# Patient Record
Sex: FEMALE | Race: WHITE | NOT HISPANIC OR LATINO | Employment: UNEMPLOYED | URBAN - METROPOLITAN AREA
[De-identification: names, ages, dates, MRNs, and addresses within clinical notes are randomized per-mention and may not be internally consistent; named-entity substitution may affect disease eponyms.]

---
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
## 2024-01-03 ENCOUNTER — OFFICE VISIT (OUTPATIENT)
Dept: OBGYN CLINIC | Facility: OTHER | Age: 19
End: 2024-01-03
Payer: COMMERCIAL

## 2024-01-03 VITALS — WEIGHT: 140 LBS | BODY MASS INDEX: 21.97 KG/M2 | HEIGHT: 67 IN

## 2024-01-03 DIAGNOSIS — S83.242A OTHER TEAR OF MEDIAL MENISCUS, CURRENT INJURY, LEFT KNEE, INITIAL ENCOUNTER: Primary | ICD-10-CM

## 2024-01-03 PROCEDURE — 99214 OFFICE O/P EST MOD 30 MIN: CPT | Performed by: ORTHOPAEDIC SURGERY

## 2024-01-03 RX ORDER — CEFAZOLIN SODIUM 1 G/50ML
1000 SOLUTION INTRAVENOUS ONCE
Status: CANCELLED | OUTPATIENT
Start: 2024-01-04 | End: 2024-01-03

## 2024-01-03 RX ORDER — METHOCARBAMOL 500 MG/1
TABLET, FILM COATED ORAL
COMMUNITY

## 2024-01-03 RX ORDER — CHLORHEXIDINE GLUCONATE ORAL RINSE 1.2 MG/ML
15 SOLUTION DENTAL ONCE
Status: CANCELLED | OUTPATIENT
Start: 2024-01-04 | End: 2024-01-03

## 2024-01-03 RX ORDER — TRANEXAMIC ACID 10 MG/ML
1000 INJECTION, SOLUTION INTRAVENOUS ONCE
Status: CANCELLED | OUTPATIENT
Start: 2024-01-04 | End: 2024-01-03

## 2024-01-03 NOTE — H&P (VIEW-ONLY)
"Orthopaedic Surgery - Office Note  Nupur Saleem (18 y.o. female)   : 2005   MRN: [id number]  Encounter Date: 1/3/2024    Chief Complaint   Patient presents with    Left Knee - Pain       Assessment / Plan  Left Knee - Bucket handle tear of lateral meniscus    The diagnosis and treatment options were reviewed.  The patient wishes to proceed with lateral meniscus repair.  The risks, benefits, and alternatives were discussed.  Written consent was obtained.  No follow-ups on file.    History of Present Illness  Nupur Saleem is a 18 y.o. female who presents for left knee pain. She reports on 2023, she went to sit down on the couch with her knee in flexion and underneath her. When she put pressure on the knee she felt a pop and immediate pain. She went to the ED at [facility]. She reports she is unable to fully extend her knee. She has been using crutches to help her walk. She got an MRI, which shows a bucket handle tear of the lateral mensicus. She denies any numbness and tingling.    Review of Systems  Pertinent items are noted in HPI.  All other systems were reviewed and are negative.    Physical Exam  Ht 5' 7\" (1.702 m)   Wt 63.5 kg (140 lb)   BMI 21.93 kg/m²   Cons: Appears well.  No apparent distress.  Psych: Alert. Oriented x3.  Mood and affect normal.  Eyes: PERRLA, EOMI  Resp: Normal effort.  No audible wheezing or stridor.  CV: Palpable pulse.  No discernable arrhythmia.  No LE edema.  Lymph:  No palpable cervical, axillary, or inguinal lymphadenopathy.  Skin: Warm.  No palpable masses.  No visible lesions.  Neuro: Normal muscle tone.  Normal and symmetric DTR's.     Left Knee Exam  Alignment:  Normal knee alignment.  Inspection:  No swelling. No ecchymosis.  Palpation:   moderate tenderness at lateral joint line.  ROM:  Knee Extension 45. Knee Flexion 120.  Strength:  5/5 quadriceps and hamstrings.  Stability:  No objective knee instability. Stable Varus / Valgus stress, Lachman, and Posterior " drawer.  Tests:  (+) Kang.  Patella:  Patella tracks centrally without crepitus.  Neurovascular:  Sensation intact in DP/SP/Sarah/Sa/T nerve distributions.  2+ DP & PT pulses.  Gait:  Not tested.     Studies Reviewed  I have personally reviewed pertinent films in PACS.  MRI of left knee - bucket handle tear of lateral meniscus    Procedures  No procedures today.    Medical, Surgical, Family, and Social History  The patient's medical history, family history, and social history, were reviewed and updated as appropriate.    History reviewed. No pertinent past medical history.    Past Surgical History:   Procedure Laterality Date    BUNIONECTOMY Left 07/11/2023       Family History   Problem Relation Age of Onset    Thyroid disease Mother     Hypertension Father     Asthma Sister        Social History     Occupational History    Not on file   Tobacco Use    Smoking status: Never    Smokeless tobacco: Never   Vaping Use    Vaping status: Never Used   Substance and Sexual Activity    Alcohol use: Never    Drug use: Never    Sexual activity: Yes     Birth control/protection: Condom Male, OCP       No Known Allergies      Current Outpatient Medications:     clotrimazole-betamethasone (LOTRISONE) 1-0.05 % cream, Apply topically 2 (two) times a day, Disp: 45 g, Rfl: 1    drospirenone-ethinyl estradiol (HAILEY) 3-0.02 MG per tablet, Take 1 tablet by mouth daily, Disp: 84 tablet, Rfl: 3    methocarbamol (ROBAXIN) 500 mg tablet, TAKE 1 TABLET BY MOUTH 4 TIMES DAILY AS NEEDED FOR MUSCLE SPASMS, Disp: , Rfl:     HYDROcodone-acetaminophen (NORCO) 5-325 mg per tablet, TAKE 1 TABLET BY MOUTH EVERY 6 HOURS AS NEEDED FOR SEVERE PAIN (PAIN SCORE 7-10). MAX DAILY AMOUNT: 4 TABLETS (Patient not taking: Reported on 7/21/2023), Disp: , Rfl:     ibuprofen (MOTRIN) 600 mg tablet, Take 600 mg by mouth 3 (three) times daily after meals (Patient not taking: Reported on 10/13/2023), Disp: , Rfl:       Keaton Lomas Attestation       I,:  Keaton Scales am acting as a scribe while in the presence of the attending physician.:       I,:  Akil Fuentes MD personally performed the services described in this documentation    as scribed in my presence.:

## 2024-01-03 NOTE — PROGRESS NOTES
"Orthopaedic Surgery - Office Note  Nupur Saleem (18 y.o. female)   : 2005   MRN: [id number]  Encounter Date: 1/3/2024    Chief Complaint   Patient presents with    Left Knee - Pain       Assessment / Plan  Left Knee - Bucket handle tear of lateral meniscus    The diagnosis and treatment options were reviewed.  The patient wishes to proceed with lateral meniscus repair.  The risks, benefits, and alternatives were discussed.  Written consent was obtained.  No follow-ups on file.    History of Present Illness  Nupur Saleem is a 18 y.o. female who presents for left knee pain. She reports on 2023, she went to sit down on the couch with her knee in flexion and underneath her. When she put pressure on the knee she felt a pop and immediate pain. She went to the ED at [facility]. She reports she is unable to fully extend her knee. She has been using crutches to help her walk. She got an MRI, which shows a bucket handle tear of the lateral mensicus. She denies any numbness and tingling.    Review of Systems  Pertinent items are noted in HPI.  All other systems were reviewed and are negative.    Physical Exam  Ht 5' 7\" (1.702 m)   Wt 63.5 kg (140 lb)   BMI 21.93 kg/m²   Cons: Appears well.  No apparent distress.  Psych: Alert. Oriented x3.  Mood and affect normal.  Eyes: PERRLA, EOMI  Resp: Normal effort.  No audible wheezing or stridor.  CV: Palpable pulse.  No discernable arrhythmia.  No LE edema.  Lymph:  No palpable cervical, axillary, or inguinal lymphadenopathy.  Skin: Warm.  No palpable masses.  No visible lesions.  Neuro: Normal muscle tone.  Normal and symmetric DTR's.     Left Knee Exam  Alignment:  Normal knee alignment.  Inspection:  No swelling. No ecchymosis.  Palpation:   moderate tenderness at lateral joint line.  ROM:  Knee Extension 45. Knee Flexion 120.  Strength:  5/5 quadriceps and hamstrings.  Stability:  No objective knee instability. Stable Varus / Valgus stress, Lachman, and Posterior " drawer.  Tests:  (+) Kang.  Patella:  Patella tracks centrally without crepitus.  Neurovascular:  Sensation intact in DP/SP/Sarah/Sa/T nerve distributions.  2+ DP & PT pulses.  Gait:  Not tested.     Studies Reviewed  I have personally reviewed pertinent films in PACS.  MRI of left knee - bucket handle tear of lateral meniscus    Procedures  No procedures today.    Medical, Surgical, Family, and Social History  The patient's medical history, family history, and social history, were reviewed and updated as appropriate.    History reviewed. No pertinent past medical history.    Past Surgical History:   Procedure Laterality Date    BUNIONECTOMY Left 07/11/2023       Family History   Problem Relation Age of Onset    Thyroid disease Mother     Hypertension Father     Asthma Sister        Social History     Occupational History    Not on file   Tobacco Use    Smoking status: Never    Smokeless tobacco: Never   Vaping Use    Vaping status: Never Used   Substance and Sexual Activity    Alcohol use: Never    Drug use: Never    Sexual activity: Yes     Birth control/protection: Condom Male, OCP       No Known Allergies      Current Outpatient Medications:     clotrimazole-betamethasone (LOTRISONE) 1-0.05 % cream, Apply topically 2 (two) times a day, Disp: 45 g, Rfl: 1    drospirenone-ethinyl estradiol (HAILEY) 3-0.02 MG per tablet, Take 1 tablet by mouth daily, Disp: 84 tablet, Rfl: 3    methocarbamol (ROBAXIN) 500 mg tablet, TAKE 1 TABLET BY MOUTH 4 TIMES DAILY AS NEEDED FOR MUSCLE SPASMS, Disp: , Rfl:     HYDROcodone-acetaminophen (NORCO) 5-325 mg per tablet, TAKE 1 TABLET BY MOUTH EVERY 6 HOURS AS NEEDED FOR SEVERE PAIN (PAIN SCORE 7-10). MAX DAILY AMOUNT: 4 TABLETS (Patient not taking: Reported on 7/21/2023), Disp: , Rfl:     ibuprofen (MOTRIN) 600 mg tablet, Take 600 mg by mouth 3 (three) times daily after meals (Patient not taking: Reported on 10/13/2023), Disp: , Rfl:       Keaton Lomas Attestation       I,:  Keaton Scales am acting as a scribe while in the presence of the attending physician.:       I,:  Akil Fuentes MD personally performed the services described in this documentation    as scribed in my presence.:

## 2024-01-04 ENCOUNTER — ANESTHESIA (OUTPATIENT)
Dept: PERIOP | Facility: HOSPITAL | Age: 19
End: 2024-01-04
Payer: COMMERCIAL

## 2024-01-04 ENCOUNTER — ANESTHESIA EVENT (OUTPATIENT)
Dept: PERIOP | Facility: HOSPITAL | Age: 19
End: 2024-01-04
Payer: COMMERCIAL

## 2024-01-04 ENCOUNTER — HOSPITAL ENCOUNTER (OUTPATIENT)
Facility: HOSPITAL | Age: 19
Setting detail: OUTPATIENT SURGERY
Discharge: HOME | End: 2024-01-04
Attending: ORTHOPAEDIC SURGERY | Admitting: ORTHOPAEDIC SURGERY
Payer: COMMERCIAL

## 2024-01-04 VITALS
TEMPERATURE: 98.9 F | HEART RATE: 82 BPM | WEIGHT: 146.61 LBS | OXYGEN SATURATION: 97 % | SYSTOLIC BLOOD PRESSURE: 113 MMHG | BODY MASS INDEX: 23.01 KG/M2 | RESPIRATION RATE: 17 BRPM | DIASTOLIC BLOOD PRESSURE: 57 MMHG | HEIGHT: 67 IN

## 2024-01-04 DIAGNOSIS — S83.282A ACUTE LATERAL MENISCUS TEAR OF LEFT KNEE, INITIAL ENCOUNTER: Primary | ICD-10-CM

## 2024-01-04 LAB
EXT PREGNANCY TEST URINE: NEGATIVE
EXT. CONTROL: NORMAL

## 2024-01-04 PROCEDURE — 81025 URINE PREGNANCY TEST: CPT | Performed by: ORTHOPAEDIC SURGERY

## 2024-01-04 PROCEDURE — C1713 ANCHOR/SCREW BN/BN,TIS/BN: HCPCS | Performed by: ORTHOPAEDIC SURGERY

## 2024-01-04 PROCEDURE — 29882 ARTHRS KNE SRG MNISC RPR M/L: CPT | Performed by: ORTHOPAEDIC SURGERY

## 2024-01-04 DEVICE — FAST-FIX 360 CURVED MENISCAL                                    REPAIR SYSTEM
Type: IMPLANTABLE DEVICE | Site: KNEE | Status: FUNCTIONAL
Brand: FAST-FIX

## 2024-01-04 DEVICE — FAST-FIX 360 REVERSED CURVE                                    MENISCAL REPAIR SYSTEM
Type: IMPLANTABLE DEVICE | Site: KNEE | Status: FUNCTIONAL
Brand: FAST-FIX

## 2024-01-04 RX ORDER — SODIUM CHLORIDE 9 MG/ML
125 INJECTION, SOLUTION INTRAVENOUS CONTINUOUS
Status: DISCONTINUED | OUTPATIENT
Start: 2024-01-04 | End: 2024-01-04 | Stop reason: HOSPADM

## 2024-01-04 RX ORDER — PROPOFOL 10 MG/ML
INJECTION, EMULSION INTRAVENOUS AS NEEDED
Status: DISCONTINUED | OUTPATIENT
Start: 2024-01-04 | End: 2024-01-04

## 2024-01-04 RX ORDER — ONDANSETRON 2 MG/ML
4 INJECTION INTRAMUSCULAR; INTRAVENOUS ONCE AS NEEDED
Status: DISCONTINUED | OUTPATIENT
Start: 2024-01-04 | End: 2024-01-04 | Stop reason: HOSPADM

## 2024-01-04 RX ORDER — OXYCODONE HYDROCHLORIDE 5 MG/1
5 TABLET ORAL EVERY 4 HOURS PRN
Status: DISCONTINUED | OUTPATIENT
Start: 2024-01-04 | End: 2024-01-04 | Stop reason: HOSPADM

## 2024-01-04 RX ORDER — LIDOCAINE HCL/EPINEPHRINE/PF 2%-1:200K
VIAL (ML) INJECTION AS NEEDED
Status: DISCONTINUED | OUTPATIENT
Start: 2024-01-04 | End: 2024-01-04

## 2024-01-04 RX ORDER — CHLORHEXIDINE GLUCONATE ORAL RINSE 1.2 MG/ML
15 SOLUTION DENTAL ONCE
Status: COMPLETED | OUTPATIENT
Start: 2024-01-04 | End: 2024-01-04

## 2024-01-04 RX ORDER — ONDANSETRON 4 MG/1
4 TABLET, ORALLY DISINTEGRATING ORAL EVERY 8 HOURS PRN
Qty: 15 TABLET | Refills: 0 | Status: SHIPPED | OUTPATIENT
Start: 2024-01-04

## 2024-01-04 RX ORDER — MIDAZOLAM HYDROCHLORIDE 2 MG/2ML
INJECTION, SOLUTION INTRAMUSCULAR; INTRAVENOUS AS NEEDED
Status: DISCONTINUED | OUTPATIENT
Start: 2024-01-04 | End: 2024-01-04

## 2024-01-04 RX ORDER — OXYCODONE HYDROCHLORIDE 10 MG/1
10 TABLET ORAL EVERY 4 HOURS PRN
Status: DISCONTINUED | OUTPATIENT
Start: 2024-01-04 | End: 2024-01-04 | Stop reason: HOSPADM

## 2024-01-04 RX ORDER — OXYCODONE HYDROCHLORIDE 5 MG/1
5 TABLET ORAL EVERY 4 HOURS PRN
Qty: 30 TABLET | Refills: 0 | Status: SHIPPED | OUTPATIENT
Start: 2024-01-04 | End: 2024-01-14

## 2024-01-04 RX ORDER — DEXAMETHASONE SODIUM PHOSPHATE 10 MG/ML
INJECTION, SOLUTION INTRAMUSCULAR; INTRAVENOUS AS NEEDED
Status: DISCONTINUED | OUTPATIENT
Start: 2024-01-04 | End: 2024-01-04

## 2024-01-04 RX ORDER — ONDANSETRON 2 MG/ML
INJECTION INTRAMUSCULAR; INTRAVENOUS AS NEEDED
Status: DISCONTINUED | OUTPATIENT
Start: 2024-01-04 | End: 2024-01-04

## 2024-01-04 RX ORDER — CEFAZOLIN SODIUM 1 G/50ML
1000 SOLUTION INTRAVENOUS ONCE
Status: COMPLETED | OUTPATIENT
Start: 2024-01-04 | End: 2024-01-04

## 2024-01-04 RX ORDER — FENTANYL CITRATE/PF 50 MCG/ML
50 SYRINGE (ML) INJECTION
Status: DISCONTINUED | OUTPATIENT
Start: 2024-01-04 | End: 2024-01-04 | Stop reason: HOSPADM

## 2024-01-04 RX ORDER — NAPROXEN 500 MG/1
500 TABLET ORAL 2 TIMES DAILY WITH MEALS
Qty: 60 TABLET | Refills: 0 | Status: SHIPPED | OUTPATIENT
Start: 2024-01-04

## 2024-01-04 RX ORDER — ROPIVACAINE HYDROCHLORIDE 5 MG/ML
INJECTION, SOLUTION EPIDURAL; INFILTRATION; PERINEURAL AS NEEDED
Status: DISCONTINUED | OUTPATIENT
Start: 2024-01-04 | End: 2024-01-04

## 2024-01-04 RX ORDER — TRANEXAMIC ACID 10 MG/ML
1000 INJECTION, SOLUTION INTRAVENOUS ONCE
Status: DISCONTINUED | OUTPATIENT
Start: 2024-01-04 | End: 2024-01-04 | Stop reason: HOSPADM

## 2024-01-04 RX ORDER — LIDOCAINE HYDROCHLORIDE 20 MG/ML
INJECTION, SOLUTION EPIDURAL; INFILTRATION; INTRACAUDAL; PERINEURAL AS NEEDED
Status: DISCONTINUED | OUTPATIENT
Start: 2024-01-04 | End: 2024-01-04

## 2024-01-04 RX ORDER — FENTANYL CITRATE 50 UG/ML
INJECTION, SOLUTION INTRAMUSCULAR; INTRAVENOUS AS NEEDED
Status: DISCONTINUED | OUTPATIENT
Start: 2024-01-04 | End: 2024-01-04

## 2024-01-04 RX ADMIN — MIDAZOLAM 4 MG: 1 INJECTION INTRAMUSCULAR; INTRAVENOUS at 10:25

## 2024-01-04 RX ADMIN — CEFAZOLIN SODIUM 1000 MG: 1 SOLUTION INTRAVENOUS at 10:48

## 2024-01-04 RX ADMIN — FENTANYL CITRATE 100 MCG: 0.05 INJECTION, SOLUTION INTRAMUSCULAR; INTRAVENOUS at 10:25

## 2024-01-04 RX ADMIN — ROPIVACAINE HYDROCHLORIDE 30 ML: 5 INJECTION, SOLUTION EPIDURAL; INFILTRATION; PERINEURAL at 10:28

## 2024-01-04 RX ADMIN — LIDOCAINE HYDROCHLORIDE AND EPINEPHRINE 20 ML: 20; 5 INJECTION, SOLUTION EPIDURAL; INFILTRATION; INTRACAUDAL; PERINEURAL at 10:28

## 2024-01-04 RX ADMIN — CHLORHEXIDINE GLUCONATE 0.12% ORAL RINSE 15 ML: 1.2 LIQUID ORAL at 09:15

## 2024-01-04 RX ADMIN — OXYCODONE HYDROCHLORIDE 5 MG: 5 TABLET ORAL at 13:12

## 2024-01-04 RX ADMIN — ONDANSETRON 4 MG: 2 INJECTION INTRAMUSCULAR; INTRAVENOUS at 11:18

## 2024-01-04 RX ADMIN — LIDOCAINE HYDROCHLORIDE 60 MG: 20 INJECTION, SOLUTION EPIDURAL; INFILTRATION; INTRACAUDAL at 10:44

## 2024-01-04 RX ADMIN — PROPOFOL 200 MG: 10 INJECTION, EMULSION INTRAVENOUS at 10:44

## 2024-01-04 RX ADMIN — SODIUM CHLORIDE 125 ML/HR: 0.9 INJECTION, SOLUTION INTRAVENOUS at 10:29

## 2024-01-04 RX ADMIN — SODIUM CHLORIDE 125 ML/HR: 0.9 INJECTION, SOLUTION INTRAVENOUS at 09:15

## 2024-01-04 RX ADMIN — DEXAMETHASONE SODIUM PHOSPHATE 6 MG: 10 INJECTION INTRAMUSCULAR; INTRAVENOUS at 10:53

## 2024-01-04 NOTE — DISCHARGE INSTR - AVS FIRST PAGE
POSTOPERATIVE INSTRUCTIONS following KNEE SURGERY    MEDICATIONS:  Resume all home medications unless otherwise instructed by your surgeon.  Pain Medication:  Oxycodone 5 mg, 1-3 tablets every 3 hours as needed  If you were given a regional anesthetic (nerve block), please begin taking the pain medication as soon as you get home, even if you have minimal or no pain.  DO NOT WAIT FOR THE NERVE BLOCK TO WEAR OFF.  Possible side effects include nausea, constipation, and urinary retention.  If you experience these side effects, please call our office for assistance.  Pain med refills are authorized only during office hours (8am-4pm, Mon-Fri).  Anti-Inflammatory:  Naproxen 500 mg, 1 tablet every 12 hours for 4 weeks  Take with food.  Stop if you experience nausea, reflux, or stomach pain.  Nausea Medication:  Zofran ODT 4 mg, 1 tablet every 6 hours as needed  Fill prescription ONLY if you expericnce severe nausea.  Blood Clot Prevention:  Not Necessary  Pump your foot up and down 20 times per hour while you are less mobile.    WOUND CARE:  Keep the dressing clean and dry.  Light drainage may occur the first 2 days postop.  You may remove the dressings and get the incision wet in the shower 72 hours after surgery.  DO NOT remove steri-strips or sutures.  DO NOT immerse the incision under water.  Carefully pat the incision dry.  If there is wound drainage, re-apply a fresh dry gauze dressing.  Please call our office ([phone]) if you experience either of the following:  Sudden increase in swelling, redness, or warmth at the surgical site  Excessive incisional drainage that persists beyond the 3rd day after surgery  Oral temperature greater than 101 degrees, not relieved with Tylenol  Shortness of breath, chest pain, nausea, or any other concerning symptoms    SWELLING CONTROL:  Cold Therapy:  The cold therapy device may be used either continuously or only as needed, according to your preference.  Do not let the pad  "directly touch your skin.  Alternatively, apply ice (20 min on, 20 min off) as often as you feel is necessary.  Elevation:  Elevate the entire leg above heart level.  Place pillows under your ankle to keep your knee straight.  Compression:  Apply ACE wraps or a thigh-length compression stocking as needed.    RANGE OF MOTION:  You are allowed LIMITED RANGE OF MOTION from 0 to 90 degrees of flexion.    IMMOBILIZATION:  Your knee brace should be WORN AT ALL TIMES, including sleep.  Keep the brace LOCKED FOR WALKING.  You may unlock the brace while sitting or sleeping.  You may remove the brace only for showering.    ACTIVITY:   BEAR 50% PARTIAL WEIGHT on your operative leg.  Always use crutches to assist.  Using Crutches on Stairs:  Going up, lead with your \"good\" (nonoperative) leg.  Going down, lead with your \"bad\" (operative) leg.  Use a hand rail when available.  Knee Extension:  Place a rolled towel or pillow under your ankle for 20-30 minutes 3-5 times per day.  This will help to maintain full knee extension.  Quad Sets:  Sit or lie with your knee straight.  Tighten your quadriceps (front thigh) muscle.  Hold for 3 seconds, then relax.  Repeat 20 times per hour while awake.    PHYSICAL THERAPY:  Begin therapy 5 TO 7 DAYS AFTER SURGERY.  You were given a prescription for therapy at your preoperative office visit.  If you do not have physical therapy scheduled yet, please call our office for assistance.    FOLLOW-UP APPOINTMENT:  4-5 days after surgery with:    Dr. Akil Fuentes MD  [facility]  [address] [place] [address], [address], [place] [address]  [phone] ([place] Office)  [phone] (After-Hours)    " Unknown

## 2024-01-04 NOTE — ANESTHESIA POSTPROCEDURE EVALUATION
"Post-Op Assessment Note    CV Status:  Stable    Pain management: adequate       Mental Status:  Alert and awake   Hydration Status:  Euvolemic   PONV Controlled:  Controlled   Airway Patency:  Patent     Post Op Vitals Reviewed: Yes      Staff: Anesthesiologist               /68 (01/04/24 1243)    Temp 98.7 °F (37.1 °C) (01/04/24 1243)    Pulse 82 (01/04/24 1243)   Resp 16 (01/04/24 1243)    SpO2 100 % (01/04/24 1243)    /68   Pulse 82   Temp 98.7 °F (37.1 °C) (Temporal)   Resp 16   Ht 5' 7\" (1.702 m)   Wt 66.5 kg (146 lb 9.7 oz)   LMP 12/04/2023 (Approximate)   SpO2 100%   BMI 22.96 kg/m²     "

## 2024-01-04 NOTE — OP NOTE
OPERATIVE REPORT    PATIENT NAME: Nupur Saleem   :  2005  MRN: [id number]  Pt Location: [facility] OR ROOM 02    SURGERY DATE: 2024    SURGEON(S) and ROLE:  Primary: Akil Fuentes MD    NOTE:  No qualified resident or physician assistant was available for the case.      PREOPERATIVE DIAGNOSES:  Left Knee  Lateral Meniscus Tear    POSTOPERATIVE DIAGNOSES:  Left Knee  Same as Preoperative Diagnosis    PROCEDURES:  Left Knee Arthroscopy with:  Lateral Meniscus Repair      ANESTHESIA TYPE:  General LMA and Intra-articular block    ANESTHESIA STAFF:   Anesthesiologist: Pepper Gutierrez MD  CRNA: Kenia Serna CRNA    ESTIMATED BLOOD LOSS:  5 mL    TOURNIQUET TIME:  Not used    PERIOPERATIVE ANTIBIOTICS:  cefazolin, 1 gram    IMPLANTS:  [employer] FastFix 360 (x4)    Implant Name Type Inv. Item Serial No.  Lot No. LRB No. Used Action   DEVICE FIX FAST- D CURVED MENISCAL REPAIR SYSTEM - [id number]  DEVICE FIX FAST- D CURVED MENISCAL REPAIR SYSTEM  [employer] [id number] Left 1 Implanted   DEVICE FIX FAST- D CURVED MENISCAL REPAIR SYSTEM - [id number]  DEVICE FIX FAST- D CURVED MENISCAL REPAIR SYSTEM  [employer] [id number] Left 3 Implanted       SPECIMENS:  * No specimens in log *    DRAINS:  None      OPERATIVE INDICATIONS:  The patient is a 18 y.o. female with left knee pain and a displaced bucket-handle lateral meniscus tear.  Surgical treatment was indicated due to instability, displacement, and liklihood of prolonged or permanent functional impairment with non-surgical treatment.  After a thorough discussion of the potential risks, benefits, and alternative treatments, the patient agreed to proceed with surgery.  The patient understands that the risks of surgery include, but are not limited to: failure of repair, infection, neurovascular injury, wound healing complications, venous thromboembolism, persistent pain, stiffness, instability, recurrence of  symptoms, potential need for additional surgeries, and loss of limb or life.  Oral and written consent for surgery was obtained from the patient preoperatively.      EXAM UNDER ANESTHESIA:  Neutral alignment  ROM:  0-135 degrees  Ligaments stable to varus stress / valgus stress / Lachman / posterior drawer; negative pivot-shift  Patella tracking normal  without crepitus.      PROCEDURE AND TECHNIQUE:  On the day of surgery, the patient was identified in the preoperative holding area.  The operative site was marked by the surgeon.  The patient was taken into the operating room.  A time-out was conducted to confirm the patient's identity, the operative site, and the proposed procedure.  The patient was anesthetized, and perioperative antibiotics were administered.  The patient was positioned supine on the OR table.  All bony prominences were padded.  A tourniquet was not used.  The operative site was prepped and draped using standard sterile technique.    An anterolateral portal was established, and the arthroscope was inserted into the knee joint.  An anteromedial portal was established under direct visualization, and diagnostic arthroscopy was performed.  The joint demonstrated no synovitis or loose bodies.    In the patellofemoral compartment, the trochlea demonstrated pristine articular cartilage.  The patella demonstrated pristine articular cartilage. The patella tracking was normal.  .    In the medial compartment, the medial femoral condyle demonstrated pristine articular cartilage.  The medial tibial plateau demonstrated pristine articular cartilage.  The medial meniscus was intact.  .    In the lateral compartment, the lateral femoral condyle demonstrated pristine articular cartilage.  The lateral tibial plateau demonstrated pristine articular cartilage.  The lateral meniscus had a displaced bucket-handle tear involving the posterior horn and body.  The tear involved the vascularized red-zone and was amenable  to repair.  The meniscus was prepared with a rasp and shaver, and repaired with four FastFix 360 all-inside suture(s).    In the intercondylar notch, the PCL was intact.  The ACL was intact.    At the conclusion of the procedure, the instruments were withdrawn.  The portals and incisions were closed with absorbable sutures and steri-strips.  A sterile dressing was applied.  The surgical drapes were removed.  A locked knee brace was applied to the operative knee.  The patient was awakened from anesthesia and transported to the PACU in stable condition.      COMPLICATIONS:  None    PATIENT DISPOSITION:  PACU       SIGNATURE:  Akil Fuentes MD  DATE:  January 4, 2024  TIME:  5:59 PM

## 2024-01-04 NOTE — INTERVAL H&P NOTE
H&P reviewed. After examining the patient I find no changes in the patients condition since the H&P had been written.    Vitals:    01/04/24 0908   BP: 125/67   Pulse: 82   Resp: 16   Temp: 98.8 °F (37.1 °C)   SpO2: 99%

## 2024-01-04 NOTE — ANESTHESIA PREPROCEDURE EVALUATION
Procedure:  ARTHROSCOPY KNEE w/ lateral meniscus repair vs partial menisectomy (Left: Knee)    Relevant Problems   Musculoskeletal and Integument   (+) Tinea corporis        Physical Exam    Airway    Mallampati score: II  TM Distance: >3 FB  Neck ROM: full     Dental   No notable dental hx     Cardiovascular  Rhythm: regular    Pulmonary   Breath sounds clear to auscultation    Other Findings  post-pubertal.      Anesthesia Plan  ASA Score- 1     Anesthesia Type- general with ASA Monitors.         Additional Monitors:     Airway Plan: LMA.    Comment: Intra-articular block preop--for postop pain control--discussed with patient and mother preoperatively..       Plan Factors-    Chart reviewed.   Existing labs reviewed. Patient summary reviewed.    Patient is not a current smoker. Patient not instructed to abstain from smoking on day of procedure. Patient did not smoke on day of surgery.    There is medical exclusion for perioperative obstructive sleep apnea risk education.        Induction-     Postoperative Plan- Plan for postoperative opioid use.     Informed Consent- Anesthetic plan and risks discussed with patient and mother (Martha).

## 2024-01-05 ENCOUNTER — TELEPHONE (OUTPATIENT)
Age: 19
End: 2024-01-05

## 2024-01-05 NOTE — TELEPHONE ENCOUNTER
Caller: Patients mom    Doctor: Gina    Reason for call: Patients mom is calling to request a note because patient is going back to college. She would like a note stating she had surgery and is under his care. Also, with accommodations saying she needs assistance using the steps if the fire alarm goes off (because the elevators cannot be used). Also, if she misses class due to her post ops.    Call back#: [phone]

## 2024-01-12 ENCOUNTER — OFFICE VISIT (OUTPATIENT)
Dept: OBGYN CLINIC | Facility: MEDICAL CENTER | Age: 19
End: 2024-01-12

## 2024-01-12 VITALS
DIASTOLIC BLOOD PRESSURE: 78 MMHG | BODY MASS INDEX: 23.01 KG/M2 | HEART RATE: 75 BPM | SYSTOLIC BLOOD PRESSURE: 122 MMHG | WEIGHT: 146.61 LBS | HEIGHT: 67 IN

## 2024-01-12 DIAGNOSIS — S83.242A OTHER TEAR OF MEDIAL MENISCUS, CURRENT INJURY, LEFT KNEE, INITIAL ENCOUNTER: Primary | ICD-10-CM

## 2024-01-12 PROCEDURE — 99024 POSTOP FOLLOW-UP VISIT: CPT | Performed by: PHYSICIAN ASSISTANT

## 2024-01-12 NOTE — PROGRESS NOTES
"Orthopaedic Surgery - Office Note  Nupur Saleem (18 y.o. female)   : 2005   MRN: [id number]  Encounter Date: 2024    Chief Complaint   Patient presents with    Left Knee - Post-op       Assessment / Plan  Status post left knee arthroscopy with lateral meniscus repair on 2024    Continue with ice and analgesics/NSAIDs as needed for pain.  I did review the patient's interoperative photos with her and her father at today's visit.  I did provide the patient with a PT prescription and a meniscus repair protocol to take with her to physical therapy which she will schedule to start at school next week.  Continue with the brace and crutches as instructed.  Return in about 6 weeks (around 2024) for follow up with Dr. Fuentes.    History of Present Illness  Nupur Saleem is a 18 y.o. female who presents for follow-up status post left knee arthroscopy with lateral meniscus repair on 2024.  Overall the patient is doing well.  She has progressed off of the oxycodone completely and just takes Tylenol and Advil for pain as needed.  She has been compliant with the crutches and brace at this point.  She denies any issues with the incisions.  Denies any distal numbness or tingling.  She does go to [employer] in [facility] which she is planning on returning to next week.    Review of Systems  Pertinent items are noted in HPI.  All other systems were reviewed and are negative.    Physical Exam  /78   Pulse 75   Ht 5' 7\" (1.702 m)   Wt 66.5 kg (146 lb 9.7 oz)   LMP 2023 (Approximate)   BMI 22.96 kg/m²   Cons: Appears well.  No apparent distress.  Psych: Alert. Oriented x3.  Mood and affect normal.  Eyes: PERRLA, EOMI  Resp: Normal effort.  No audible wheezing or stridor.  CV: Palpable pulse.  No discernable arrhythmia.  No LE edema.  Lymph:  No palpable cervical, axillary, or inguinal lymphadenopathy.  Skin: Warm.  No palpable masses.  No visible " lesions.  Neuro: Normal muscle tone.  Normal and symmetric DTR's.     Left Knee Exam  Alignment:  Normal knee alignment.  Inspection:   Mild effusion, incisions healing well Steri-Strips were replaced without erythema or drainage.  Palpation:   Mild tenderness at lara-incisional areas and medial joint line of the knee where she does have some ecchymosis.  ROM:  Knee Extension 5 degrees. Knee Flexion 85 degrees.  Strength:  Able to actively extend knee against gravity.  Stability:  Not tested.  Tests:  No pertinent positive or negative tests.  Patella:  Not tested.  Neurovascular:  Sensation intact in DP/SP/Sarah/Sa/T nerve distributions. Sensation intact in all digital nerve distributions.  Toes warm and perfused.  Gait:   Steady with brace and crutches       Studies Reviewed  No studies to review    Procedures  No procedures today.    Medical, Surgical, Family, and Social History  The patient's medical history, family history, and social history, were reviewed and updated as appropriate.    No past medical history on file.    Past Surgical History:   Procedure Laterality Date    BUNIONECTOMY Left 07/11/2023    [place] ARTHRS KNE SURG W/MENISCECTOMY MED/LAT W/SHVG Left 1/4/2024    Procedure: ARTHROSCOPY KNEE w/ lateral meniscus repair;  Surgeon: Akil Fuentes MD;  Location: [facility] OR;  Service: Orthopedics       Family History   Problem Relation Age of Onset    Thyroid disease Mother     Hypertension Father     Asthma Sister        Social History     Occupational History    Not on file   Tobacco Use    Smoking status: Never    Smokeless tobacco: Never   Vaping Use    Vaping status: Never Used   Substance and Sexual Activity    Alcohol use: Never    Drug use: Never    Sexual activity: Yes     Birth control/protection: Condom Male, OCP       Allergies   Allergen Reactions    Shellfish-Derived Products - Food Allergy Hives         Current Outpatient Medications:     clotrimazole-betamethasone (LOTRISONE) 1-0.05 %  cream, Apply topically 2 (two) times a day, Disp: 45 g, Rfl: 1    drospirenone-ethinyl estradiol (HAILEY) 3-0.02 MG per tablet, Take 1 tablet by mouth daily, Disp: 84 tablet, Rfl: 3    oxyCODONE (ROXICODONE) 5 immediate release tablet, Take 1 tablet (5 mg total) by mouth every 4 (four) hours as needed for moderate pain for up to 10 days Max Daily Amount: 30 mg, Disp: 30 tablet, Rfl: 0    methocarbamol (ROBAXIN) 500 mg tablet, TAKE 1 TABLET BY MOUTH 4 TIMES DAILY AS NEEDED FOR MUSCLE SPASMS (Patient not taking: Reported on 1/12/2024), Disp: , Rfl:     naproxen (NAPROSYN) 500 mg tablet, Take 1 tablet (500 mg total) by mouth 2 (two) times a day with meals (Patient not taking: Reported on 1/12/2024), Disp: 60 tablet, Rfl: 0    ondansetron (ZOFRAN-ODT) 4 mg disintegrating tablet, Take 1 tablet (4 mg total) by mouth every 8 (eight) hours as needed for nausea or vomiting (Patient not taking: Reported on 1/12/2024), Disp: 15 tablet, Rfl: 0      Liam Castro PA-C    Scribe Attestation      I,:   am acting as a scribe while in the presence of the attending physician.:       I,:   personally performed the services described in this documentation    as scribed in my presence.:

## 2024-01-17 ENCOUNTER — TELEPHONE (OUTPATIENT)
Age: 19
End: 2024-01-17

## 2024-01-17 NOTE — TELEPHONE ENCOUNTER
Caller: [facility]    Doctor/Office: Gina SANDERSON#: [phone]      What needs to be faxed: PT order     ATTN to: [facility]    Fax#: [phone]

## 2024-03-01 ENCOUNTER — OFFICE VISIT (OUTPATIENT)
Dept: OBGYN CLINIC | Facility: MEDICAL CENTER | Age: 19
End: 2024-03-01

## 2024-03-01 VITALS
SYSTOLIC BLOOD PRESSURE: 124 MMHG | BODY MASS INDEX: 22.91 KG/M2 | WEIGHT: 146 LBS | DIASTOLIC BLOOD PRESSURE: 83 MMHG | HEART RATE: 66 BPM | HEIGHT: 67 IN

## 2024-03-01 DIAGNOSIS — S83.282A ACUTE LATERAL MENISCUS TEAR OF LEFT KNEE, INITIAL ENCOUNTER: Primary | ICD-10-CM

## 2024-03-01 PROCEDURE — 99024 POSTOP FOLLOW-UP VISIT: CPT | Performed by: ORTHOPAEDIC SURGERY

## 2024-03-01 NOTE — PROGRESS NOTES
"Orthopaedic Surgery - Office Note  Nupur Saleem (18 y.o. female)   : 2005   MRN: [id number]  Encounter Date: 3/1/2024    Chief Complaint   Patient presents with    Left Knee - Post-op       Assessment / Plan  Status post left knee arthroscopy with lateral meniscus repair on 2024     Continue with PT for motion strengtheniong  No impact or running or qwuick lateral movements. Avoid deep squats  No follow-ups on file.    History of Present Illness  Nupur Saleem is a 18 y.o. female who presents 8 weeks S/P  left knee arthroscopy with lateral meniscus repair on 2024   Patient states she is doing very well and has minimal pain with weight bearing. She is currently in therapy. She does not use crutches anymore    Review of Systems  Pertinent items are noted in HPI.  All other systems were reviewed and are negative.    Physical Exam  /83   Pulse 66   Ht 5' 7\" (1.702 m)   Wt 66.2 kg (146 lb)   BMI 22.87 kg/m²   Cons: Appears well.  No apparent distress.  Psych: Alert. Oriented x3.  Mood and affect normal.  Eyes: PERRLA, EOMI  Resp: Normal effort.  No audible wheezing or stridor.  CV: Palpable pulse.  No discernable arrhythmia.  No LE edema.  Lymph:  No palpable cervical, axillary, or inguinal lymphadenopathy.  Skin: Warm.  No palpable masses.  No visible lesions.  Neuro: Normal muscle tone.  Normal and symmetric DTR's.     Left Knee Exam  Alignment:  Normal knee alignment.  Inspection:   Mild swelling. Incision healed.  Palpation:  No tenderness.  ROM:  Knee Extension 0. Knee Flexion 120.  Strength:  Not tested.  Stability:  Not tested.  Tests:  (-) Kang.  Patella:  Not tested.  Neurovascular:  Sensation intact in DP/SP/Sarah/Sa/T nerve distributions.  2+ DP & PT pulses.  Gait:  Not tested.      Studies Reviewed  No studies to review    Procedures  No procedures today.    Medical, Surgical, Family, and Social History  The patient's medical history, family history, and social history, were reviewed " and updated as appropriate.    History reviewed. No pertinent past medical history.    Past Surgical History:   Procedure Laterality Date    BUNIONECTOMY Left 07/11/2023    [place] ARTHRS KNE SURG W/MENISCECTOMY MED/LAT W/SHVG Left 1/4/2024    Procedure: ARTHROSCOPY KNEE w/ lateral meniscus repair;  Surgeon: Akil Fuentes MD;  Location: [facility] OR;  Service: Orthopedics       Family History   Problem Relation Age of Onset    Thyroid disease Mother     Hypertension Father     Asthma Sister        Social History     Occupational History    Not on file   Tobacco Use    Smoking status: Never    Smokeless tobacco: Never   Vaping Use    Vaping status: Never Used   Substance and Sexual Activity    Alcohol use: Never    Drug use: Never    Sexual activity: Yes     Birth control/protection: Condom Male, OCP       Allergies   Allergen Reactions    Shellfish-Derived Products - Food Allergy Hives         Current Outpatient Medications:     clotrimazole-betamethasone (LOTRISONE) 1-0.05 % cream, Apply topically 2 (two) times a day, Disp: 45 g, Rfl: 1    drospirenone-ethinyl estradiol (HAILEY) 3-0.02 MG per tablet, Take 1 tablet by mouth daily, Disp: 84 tablet, Rfl: 3    methocarbamol (ROBAXIN) 500 mg tablet, TAKE 1 TABLET BY MOUTH 4 TIMES DAILY AS NEEDED FOR MUSCLE SPASMS (Patient not taking: Reported on 1/12/2024), Disp: , Rfl:     naproxen (NAPROSYN) 500 mg tablet, Take 1 tablet (500 mg total) by mouth 2 (two) times a day with meals (Patient not taking: Reported on 1/12/2024), Disp: 60 tablet, Rfl: 0    ondansetron (ZOFRAN-ODT) 4 mg disintegrating tablet, Take 1 tablet (4 mg total) by mouth every 8 (eight) hours as needed for nausea or vomiting (Patient not taking: Reported on 1/12/2024), Disp: 15 tablet, Rfl: 0      Shen Nash    Scribe Attestation      I,:   am acting as a scribe while in the presence of the attending physician.:       I,:   personally performed the services described in this documentation    as scribed in my  presence.:

## 2024-04-05 ENCOUNTER — OFFICE VISIT (OUTPATIENT)
Dept: OBGYN CLINIC | Facility: MEDICAL CENTER | Age: 19
End: 2024-04-05
Payer: COMMERCIAL

## 2024-04-05 VITALS
BODY MASS INDEX: 24.33 KG/M2 | HEART RATE: 69 BPM | WEIGHT: 155 LBS | SYSTOLIC BLOOD PRESSURE: 117 MMHG | HEIGHT: 67 IN | DIASTOLIC BLOOD PRESSURE: 79 MMHG

## 2024-04-05 DIAGNOSIS — S83.242A OTHER TEAR OF MEDIAL MENISCUS, CURRENT INJURY, LEFT KNEE, INITIAL ENCOUNTER: Primary | ICD-10-CM

## 2024-04-05 PROCEDURE — 99213 OFFICE O/P EST LOW 20 MIN: CPT | Performed by: ORTHOPAEDIC SURGERY

## 2024-04-05 NOTE — PROGRESS NOTES
"Orthopaedic Surgery - Office Note  Nupur Saleem (18 y.o. female)   : 2005   MRN: [id number]  Encounter Date: 2024    Assessment / Plan    S/p left knee arthroscopy with lateral meniscus tear  Continue with outpatient PT, transition to HEP when ready  Patient is doing well with no concerns today  Ice, heat and anti-inflammatories prn   Follow-up:  No follow-ups on file.      Chief Complaint / Date of Onset  Doing well today   Injury Mechanism / Date  none  Surgery / Date  2024    History of Present Illness   Nupur Saleem is a 18 y.o. female who presents for for follow up S/p left knee arthroscopy with lateral meniscus tear on 2024. She has continued to progress well in PT. She notes she just began jogging which is going well. She denies any recent pain or swelling. She continues to be happy with her outcomes and offers no complaints today.     Treatment Summary  Medications / Modalities  N/A  Bracing / Immobilization  N/A  Physical Therapy  Currently in PT, began post operatively   Injections  N/A  Prior Surgeries  N/A  Other Treatments  N/A    Employment / Current Status  Student     Sport / Organization / Current Status  none      Review of Systems  Pertinent items are noted in HPI.  All other systems were reviewed and are negative.      Physical Exam  Ht 5' 7\" (1.702 m)   BMI 22.87 kg/m²   Cons: Appears well.  No apparent distress.  Psych: Alert. Oriented x3.  Mood and affect normal.  Eyes: PERRLA, EOMI  Resp: Normal effort.  No audible wheezing or stridor.  CV: Palpable pulse.  No discernable arrhythmia.  No LE edema.  Lymph:  No palpable cervical, axillary, or inguinal lymphadenopathy.  Skin: Warm.  No palpable masses.  No visible lesions.  Neuro: Normal muscle tone.  Normal and symmetric DTR's.     Left Knee Exam  Alignment:  Normal knee alignment.  Inspection:  No swelling. No ecchymosis.  Palpation:   mild lateral joint line tenderness. No effusion.  ROM:  Knee Extension 0. Knee Flexion " 135.  Strength:  Able to SLR without lag.  Stability:  No objective knee instability. Stable Varus / Valgus stress, Lachman, and Posterior drawer.  Tests:  (-) Kang.  Patella:  Normal patellar mobility.  Neurovascular:  Sensation intact in DP/SP/Sarah/Sa/T nerve distributions.  2+ DP & PT pulses.  Gait:  Normal.       Studies Reviewed  No studies to review      Procedures  No procedures today.    Medical, Surgical, Family, and Social History  The patient's medical history, family history, and social history, were reviewed and updated as appropriate.    No past medical history on file.    Past Surgical History:   Procedure Laterality Date    BUNIONECTOMY Left 07/11/2023    [place] ARTHRS KNE SURG W/MENISCECTOMY MED/LAT W/SHVG Left 1/4/2024    Procedure: ARTHROSCOPY KNEE w/ lateral meniscus repair;  Surgeon: Akil Fuentes MD;  Location: [facility] OR;  Service: Orthopedics       Family History   Problem Relation Age of Onset    Thyroid disease Mother     Hypertension Father     Asthma Sister        Social History     Occupational History    Not on file   Tobacco Use    Smoking status: Never    Smokeless tobacco: Never   Vaping Use    Vaping status: Never Used   Substance and Sexual Activity    Alcohol use: Never    Drug use: Never    Sexual activity: Yes     Birth control/protection: Condom Male, OCP       Allergies   Allergen Reactions    Shellfish-Derived Products - Food Allergy Hives         Current Outpatient Medications:     clotrimazole-betamethasone (LOTRISONE) 1-0.05 % cream, Apply topically 2 (two) times a day, Disp: 45 g, Rfl: 1    drospirenone-ethinyl estradiol (HAILEY) 3-0.02 MG per tablet, Take 1 tablet by mouth daily, Disp: 84 tablet, Rfl: 3    methocarbamol (ROBAXIN) 500 mg tablet, TAKE 1 TABLET BY MOUTH 4 TIMES DAILY AS NEEDED FOR MUSCLE SPASMS (Patient not taking: Reported on 1/12/2024), Disp: , Rfl:     naproxen (NAPROSYN) 500 mg tablet, Take 1 tablet (500 mg total) by mouth 2 (two) times a day with  meals (Patient not taking: Reported on 1/12/2024), Disp: 60 tablet, Rfl: 0    ondansetron (ZOFRAN-ODT) 4 mg disintegrating tablet, Take 1 tablet (4 mg total) by mouth every 8 (eight) hours as needed for nausea or vomiting (Patient not taking: Reported on 1/12/2024), Disp: 15 tablet, Rfl: 0      Masha Gupta    Scribe Attestation      I,:   am acting as a scribe while in the presence of the attending physician.:       I,:   personally performed the services described in this documentation    as scribed in my presence.:

## 2024-06-15 DIAGNOSIS — Z30.41 ENCOUNTER FOR SURVEILLANCE OF CONTRACEPTIVE PILLS: ICD-10-CM

## 2024-06-15 RX ORDER — DROSPIRENONE AND ETHINYL ESTRADIOL TABLETS 0.02-3(28)
1 KIT ORAL DAILY
Qty: 84 TABLET | Refills: 0 | Status: SHIPPED | OUTPATIENT
Start: 2024-06-15

## 2024-07-08 ENCOUNTER — OFFICE VISIT (OUTPATIENT)
Dept: OBGYN CLINIC | Facility: MEDICAL CENTER | Age: 19
End: 2024-07-08
Payer: COMMERCIAL

## 2024-07-08 VITALS
DIASTOLIC BLOOD PRESSURE: 77 MMHG | SYSTOLIC BLOOD PRESSURE: 114 MMHG | HEIGHT: 67 IN | HEART RATE: 62 BPM | BODY MASS INDEX: 24.64 KG/M2 | WEIGHT: 157 LBS

## 2024-07-08 DIAGNOSIS — Z47.89 AFTERCARE FOLLOWING SURGERY OF THE MUSCULOSKELETAL SYSTEM: Primary | ICD-10-CM

## 2024-07-08 DIAGNOSIS — Z98.890 STATUS POST LATERAL MENISCUS REPAIR OF LEFT KNEE: ICD-10-CM

## 2024-07-08 PROCEDURE — 99213 OFFICE O/P EST LOW 20 MIN: CPT | Performed by: ORTHOPAEDIC SURGERY

## 2024-07-08 NOTE — PROGRESS NOTES
"Orthopaedic Surgery - Office Note  Nupur Saleem (18 y.o. female)   : 2005   MRN: [id number]  Encounter Date: 2024    Assessment / Plan    Status post left knee arthroscopy with lateral meniscus repair, 2024    Activity as tolerated  No restrictions  Due to given complaints of left LE swelling with prolonged standing, recommend wearing a compression sleeve/stocking on her left LE when working to reduce the swelling  Follow-up:  Return if symptoms worsen or fail to improve, for re-check on an as needed basis (PRN).      Chief Complaint / Date of Onset  Left knee follow-up  Injury Mechanism / Date  None  Surgery / Date  2024, left knee arthroscopy with lateral meniscus repair    History of Present Illness   Nupur Saleem is a 18 y.o. female who presents for repeat evaluation of her left knee, 6-month status post left knee arthroscopy with lateral meniscus repair.  Overall she is doing well.  She has no new complaints.  She denies any locking or catching or any instability episodes.  She does note swelling distally when she stands for extended periods of time, ie: 6 hrs at work.     Treatment Summary  Medications / Modalities  None  Bracing / Immobilization  None  Physical Therapy  None  Injections  None  Prior Surgeries  Left knee arthroscopy with lateral meniscus repair, 2024  Other Treatments  None    Employment / Current Status  Works in a kitchen for the summer    Sport / Organization / Current Status  active      Review of Systems  Pertinent items are noted in HPI.  All other systems were reviewed and are negative.      Physical Exam  /77   Pulse 62   Ht 5' 7\" (1.702 m)   Wt 71.2 kg (157 lb)   BMI 24.59 kg/m²   Cons: Appears well.  No apparent distress.  Psych: Alert. Oriented x3.  Mood and affect normal.  Eyes: PERRLA, EOMI  Resp: Normal effort.  No audible wheezing or stridor.  CV: Palpable pulse.  No discernable arrhythmia.  No LE edema.  Lymph:  No palpable cervical, axillary, or " inguinal lymphadenopathy.  Skin: Warm.  No palpable masses.  No visible lesions.  Neuro: Normal muscle tone.  Normal and symmetric DTR's.     Left Knee Exam  Alignment:  Normal knee alignment.  Inspection:  No swelling. No edema. No erythema. No ecchymosis. Incisions healed.  Palpation:  No tenderness. No effusion. No warmth. No crepitus.  ROM:  Normal knee ROM.full  Strength:  5/5 quadriceps and hamstrings.  Stability:  No objective knee instability. Stable Varus / Valgus stress, Lachman, and Posterior drawer.  Tests:  No pertinent positive or negative tests. (-) Kang.  Patella:  Patella tracks centrally without crepitus.  Neurovascular:  Sensation inatact L1-S1 LLE.    Gait:  Normal.        Studies Reviewed  No studies to review      Procedures  No procedures today.    Medical, Surgical, Family, and Social History  The patient's medical history, family history, and social history, were reviewed and updated as appropriate.    History reviewed. No pertinent past medical history.    Past Surgical History:   Procedure Laterality Date    BUNIONECTOMY Left 07/11/2023    [place] ARTHRS KNE SURG W/MENISCECTOMY MED/LAT W/SHVG Left 1/4/2024    Procedure: ARTHROSCOPY KNEE w/ lateral meniscus repair;  Surgeon: Akil Fuentes MD;  Location: [facility];  Service: Orthopedics       Family History   Problem Relation Age of Onset    Thyroid disease Mother     Hypertension Father     Asthma Sister        Social History     Occupational History    Not on file   Tobacco Use    Smoking status: Never     Passive exposure: Never    Smokeless tobacco: Never   Vaping Use    Vaping status: Never Used   Substance and Sexual Activity    Alcohol use: Never    Drug use: Never    Sexual activity: Yes     Birth control/protection: Condom Male, OCP       Allergies   Allergen Reactions    Shellfish-Derived Products - Food Allergy Hives         Current Outpatient Medications:     clotrimazole-betamethasone (LOTRISONE) 1-0.05 % cream, Apply  topically 2 (two) times a day, Disp: 45 g, Rfl: 1    drospirenone-ethinyl estradiol (Loryna) 3-0.02 MG per tablet, Take 1 tablet by mouth once daily, Disp: 84 tablet, Rfl: 0    methocarbamol (ROBAXIN) 500 mg tablet, TAKE 1 TABLET BY MOUTH 4 TIMES DAILY AS NEEDED FOR MUSCLE SPASMS (Patient not taking: Reported on 1/12/2024), Disp: , Rfl:     naproxen (NAPROSYN) 500 mg tablet, Take 1 tablet (500 mg total) by mouth 2 (two) times a day with meals (Patient not taking: Reported on 1/12/2024), Disp: 60 tablet, Rfl: 0    ondansetron (ZOFRAN-ODT) 4 mg disintegrating tablet, Take 1 tablet (4 mg total) by mouth every 8 (eight) hours as needed for nausea or vomiting (Patient not taking: Reported on 1/12/2024), Disp: 15 tablet, Rfl: 0      Kma Moscoso    Scribe Attestation      I,:  Kam Moscoso am acting as a scribe while in the presence of the attending physician.:       I,:  Akil Fuentes MD personally performed the services described in this documentation    as scribed in my presence.:

## 2024-07-12 ENCOUNTER — TELEPHONE (OUTPATIENT)
Dept: FAMILY MEDICINE CLINIC | Facility: CLINIC | Age: 19
End: 2024-07-12

## 2024-07-12 ENCOUNTER — CLINICAL SUPPORT (OUTPATIENT)
Dept: FAMILY MEDICINE CLINIC | Facility: CLINIC | Age: 19
End: 2024-07-12
Payer: COMMERCIAL

## 2024-07-12 ENCOUNTER — TELEPHONE (OUTPATIENT)
Age: 19
End: 2024-07-12

## 2024-07-12 DIAGNOSIS — Z11.1 ENCOUNTER FOR PPD TEST: Primary | ICD-10-CM

## 2024-07-12 PROCEDURE — 86580 TB INTRADERMAL TEST: CPT | Performed by: PHYSICIAN ASSISTANT

## 2024-07-12 NOTE — TELEPHONE ENCOUNTER
Patient's mother called in to schedule a physical and 2 step PPD for patient. Apt scheduled for 7/19 0700.    Mother stated patient's college forms state :    2 step PPD T spot and Quantiferon TB gold not accepted.     Must be 2 step PPD completed must be placed 7-21 days after first negative.     Mother is inquiring if this can be done in office.     Please advise.  Mother would like a return call.  Thank you

## 2024-07-15 LAB
INDURATION: 0 MM
TB SKIN TEST: NEGATIVE

## 2024-07-17 ENCOUNTER — OFFICE VISIT (OUTPATIENT)
Dept: URGENT CARE | Facility: MEDICAL CENTER | Age: 19
End: 2024-07-17
Payer: COMMERCIAL

## 2024-07-17 VITALS
BODY MASS INDEX: 24.64 KG/M2 | SYSTOLIC BLOOD PRESSURE: 102 MMHG | HEIGHT: 67 IN | WEIGHT: 157 LBS | TEMPERATURE: 97.7 F | OXYGEN SATURATION: 99 % | DIASTOLIC BLOOD PRESSURE: 78 MMHG | HEART RATE: 93 BPM | RESPIRATION RATE: 18 BRPM

## 2024-07-17 DIAGNOSIS — M26.621 ARTHRALGIA OF RIGHT TEMPOROMANDIBULAR JOINT: Primary | ICD-10-CM

## 2024-07-17 PROCEDURE — 99213 OFFICE O/P EST LOW 20 MIN: CPT

## 2024-07-17 NOTE — PROGRESS NOTES
[facility]        NAME: Nupur Saleem is a 18 y.o. female  : 2005    MRN: [id number]  DATE: 2024  TIME: 11:28 AM    Assessment and Plan   Arthralgia of right temporomandibular joint [M26.621]  1. Arthralgia of right temporomandibular joint  Diclofenac Sodium (VOLTAREN) 1 %    Ambulatory Referral to Otolaryngology        Pain in the right TMJ started yesterday.  States that she was trying to crack her jaw she felt sudden pain.  Ibuprofen, did not take it today.  Mild tenderness in that area on exam, however, normal alignment bilaterally without any abnormal clicking.  Will have patient continue with ibuprofen and I sent in prescription for Voltaren gel.  Given ENT referral to follow-up with if no improvement over the next 1 to 2 weeks.  Advised to go to the ER if any symptoms worsen.    Patient Instructions     Take prescribed medication as instructed.  Continue with over-the-counter ibuprofen 400 to 600 mg every 6 hours as needed.  Do not take on an empty stomach.  Stick with softer foods to avoid reaggravating pain in the jaw.  Avoid frequent wide opening of mouth.  Follow-up with ENT referral if needed for continuation of symptoms/pain.  Follow up with PCP in 3-5 days.  Proceed to  ER if symptoms worsen.    If tests are performed, our office will contact you with results only if changes need to made to the care plan discussed with you at the visit. [facility]    Chief Complaint     Chief Complaint   Patient presents with    Jaw Pain     Right sided jaw pain woke up yesterday felt like jaw was locked, cracked it and started with jaw pain          History of Present Illness       18-year-old female here with mom for right-sided jaw pain that began yesterday.  Denies fall or injury.  Denies prior injury or surgery to this area.  Patient states she was trying to crack her jaw when she felt sudden pain.  Pain worse with opening mouth widely.  Did  not take ibuprofen today, but did take it yesterday.  Denies fever, chills, chest pain, shortness of breath.        Review of Systems   Review of Systems   Constitutional: Negative.    HENT:          Right-sided jaw pain   Respiratory: Negative.     Cardiovascular: Negative.    Skin: Negative.    Neurological: Negative.          Current Medications       Current Outpatient Medications:     Diclofenac Sodium (VOLTAREN) 1 %, Apply 2 g topically 4 (four) times a day as needed (pain), Disp: 100 g, Rfl: 0    drospirenone-ethinyl estradiol (Loryna) 3-0.02 MG per tablet, Take 1 tablet by mouth once daily, Disp: 84 tablet, Rfl: 0    clotrimazole-betamethasone (LOTRISONE) 1-0.05 % cream, Apply topically 2 (two) times a day, Disp: 45 g, Rfl: 1    methocarbamol (ROBAXIN) 500 mg tablet, TAKE 1 TABLET BY MOUTH 4 TIMES DAILY AS NEEDED FOR MUSCLE SPASMS (Patient not taking: Reported on 1/12/2024), Disp: , Rfl:     naproxen (NAPROSYN) 500 mg tablet, Take 1 tablet (500 mg total) by mouth 2 (two) times a day with meals (Patient not taking: Reported on 1/12/2024), Disp: 60 tablet, Rfl: 0    ondansetron (ZOFRAN-ODT) 4 mg disintegrating tablet, Take 1 tablet (4 mg total) by mouth every 8 (eight) hours as needed for nausea or vomiting (Patient not taking: Reported on 1/12/2024), Disp: 15 tablet, Rfl: 0    Current Allergies     Allergies as of 07/17/2024 - Reviewed 07/17/2024   Allergen Reaction Noted    Shellfish-derived products - food allergy Hives 01/04/2024            The following portions of the patient's history were reviewed and updated as appropriate: allergies, current medications, past family history, past medical history, past social history, past surgical history and problem list.     History reviewed. No pertinent past medical history.    Past Surgical History:   Procedure Laterality Date    BUNIONECTOMY Left 07/11/2023    [place] ARTHRS KNE SURG W/MENISCECTOMY MED/LAT W/SHVG Left 1/4/2024    Procedure: ARTHROSCOPY KNEE w/  "lateral meniscus repair;  Surgeon: Akil Fuentes MD;  Location: [facility] OR;  Service: Orthopedics       Family History   Problem Relation Age of Onset    Thyroid disease Mother     Hypertension Father     Asthma Sister          Medications have been verified.        Objective   /78   Pulse 93   Temp 97.7 °F (36.5 °C)   Resp 18   Ht 5' 7\" (1.702 m)   Wt 71.2 kg (157 lb)   SpO2 99%   BMI 24.59 kg/m²        Physical Exam     Physical Exam  Constitutional:       General: She is not in acute distress.     Appearance: Normal appearance. She is not ill-appearing, toxic-appearing or diaphoretic.   HENT:      Head: Normocephalic and atraumatic.      Jaw: Tenderness (Mild tenderness over the right TMJ on exam.  No swelling, bruising, redness.  No abnormal clicking.  Able to open and close normally.) and pain on movement present. No trismus or swelling.        Mouth/Throat:      Lips: Pink.      Mouth: Mucous membranes are moist.      Dentition: Normal dentition. No dental tenderness, gingival swelling, dental caries or dental abscesses.      Pharynx: Oropharynx is clear. Uvula midline. No pharyngeal swelling, oropharyngeal exudate, posterior oropharyngeal erythema, uvula swelling or postnasal drip.      Tonsils: No tonsillar exudate or tonsillar abscesses.   Eyes:      Extraocular Movements: Extraocular movements intact.      Pupils: Pupils are equal, round, and reactive to light.   Cardiovascular:      Rate and Rhythm: Normal rate and regular rhythm.      Pulses: Normal pulses.      Heart sounds: Normal heart sounds.   Pulmonary:      Effort: Pulmonary effort is normal. No respiratory distress.      Breath sounds: Normal breath sounds.   Musculoskeletal:      Cervical back: Normal range of motion and neck supple. No rigidity or tenderness.   Skin:     General: Skin is warm and dry.      Capillary Refill: Capillary refill takes less than 2 seconds.      Findings: No bruising, erythema or rash. "   Neurological:      Mental Status: She is alert and oriented to person, place, and time.   Psychiatric:         Mood and Affect: Mood normal.

## 2024-07-17 NOTE — PATIENT INSTRUCTIONS
"Take prescribed medication as instructed.  Continue with over-the-counter ibuprofen 400 to 600 mg every 6 hours as needed.  Do not take on an empty stomach.  Stick with softer foods to avoid reaggravating pain in the jaw.  Avoid frequent wide opening of mouth.  Follow-up with ENT referral if needed for continuation of symptoms/pain.  Follow up with PCP in 3-5 days.  Proceed to  ER if symptoms worsen.    If tests are performed, our office will contact you with results only if changes need to made to the care plan discussed with you at the visit. You can review your full results on [facility].  Patient Education     Temporomandibular joint (TMJ) disorders   The Basics   Written by the doctors and editors at [facility]   What are temporomandibular joint disorders? -- Temporomandibular joint disorders (often referred to as just \"TMJ\") are problems with the jaw joint and the muscles around it. The jaw joint, called the \"temporomandibular joint,\" is located in front of the ear where the jawbone connects to the head. To feel the joint, place your finger on your cheek just in front of your ear and then open and close your mouth.  TMJ disorders can be caused by many problems, including arthritis. Sometimes, TMJ is due to a combination of stress, jaw clenching, teeth grinding, and other things that strain the jaw joint and the muscles around it. Some people with TMJ also have anxiety, depression, or a general increased awareness of pain.  What are the symptoms of TMJ? -- The main symptom of TMJ is a dull pain on just 1 side of the face, near the ear. Sometimes, the pain also affects the ear, jaw, or back of the neck. Some people have headaches with TMJ. The pain of TMJ is typically constant, but can come and go. It is usually worse with jaw movement. People with TMJ might hear a clicking or popping sound or experience a \"crunching feeling\" in the joint when they open and close their mouth.  Should I see a doctor or " "nurse? -- If the pain in your face or jaw is bothering you and does not go away, see your doctor or nurse.  What tests might I need? -- There is no single test that can show if you have TMJ. Your doctor or nurse should be able to tell if you have TMJ by learning about your symptoms and doing an exam.  Unless the doctor suspects something unusual, most people will not need X-rays or an MRI (an imaging test that creates pictures of the inside of the body). But in some cases, your doctor might order a special X-ray called a \"panoramic radiography of the jaw.\" This can show the TMJ shape and bone structure, the teeth, and the sinuses. (The sinuses are hollow areas in the bones of the face.) This test can look for other things that can cause jaw pain.  How is TMJ treated? -- No single treatment for TMJ works for everyone. Most of the time, medicines and simple lifestyle changes can help. Most patients get better over time, even without treatment, so patience is important.  Your doctor or nurse will help you find the right mix of treatments for you. They might refer you to a dentist who specializes in TMJ. Treatment options include:   Education and self-care - This includes following instructions from your doctor about how to avoid things that trigger TMJ pain. It also involves learning different ways to help you relax and manage stress. Some people might also improve with simple jaw exercises. These can be done with a physical therapist (exercise expert), or you can do them on your own.   Medicines to relieve pain and relax the muscles - There are several types of medicines used to treat TMJ. These include nonsteroidal antiinflammatory drugs (\"NSAIDs\"), muscle relaxants, and certain medicines used for depression. (Medicines for depression can relieve pain even in people who are not depressed.) Your doctor will decide which medicine or group of medicines is best for you.   Devices - These are called \"bite plates\" or " "\"occlusal splints.\" They fit in your mouth and keep you from grinding your teeth at night. They are made out of either a hard or soft plastic and might be made specially to fit your mouth.  If these treatments don't help, your doctor might suggest that you see a specialist, such as an oral surgeon. The specialist might use medicines given by injection (shots) to the area to treat the pain. It is rare that people need surgery for TMJ.  All topics are updated as new evidence becomes available and our peer review process is complete.  This topic retrieved from [employer] on: Feb 26, 2024.  Topic 23267 Version 16.0  Release: 32.2.4 - C32.56  © 2024 UpToDate, Inc. and/or its affiliates. All rights reserved.  Consumer Information Use and Disclaimer   Disclaimer: This generalized information is a limited summary of diagnosis, treatment, and/or medication information. It is not meant to be comprehensive and should be used as a tool to help the user understand and/or assess potential diagnostic and treatment options. It does NOT include all information about conditions, treatments, medications, side effects, or risks that may apply to a specific patient. It is not intended to be medical advice or a substitute for the medical advice, diagnosis, or treatment of a health care provider based on the health care provider's examination and assessment of a patient's specific and unique circumstances. Patients must speak with a health care provider for complete information about their health, medical questions, and treatment options, including any risks or benefits regarding use of medications. This information does not endorse any treatments or medications as safe, effective, or approved for treating a specific patient. UpToDate, Inc. and its affiliates disclaim any warranty or liability relating to this information or the use thereof.The use of this information is governed by the Terms of Use, available at " https://www.woltersaPriori Technologiesuwer.com/en/know/clinical-effectiveness-terms. 2024© [employer] and its affiliates and/or licensors. All rights reserved.  Copyright   © 2024 [employer] and/or its affiliates. All rights reserved.

## 2024-07-19 ENCOUNTER — OFFICE VISIT (OUTPATIENT)
Dept: FAMILY MEDICINE CLINIC | Facility: CLINIC | Age: 19
End: 2024-07-19
Payer: COMMERCIAL

## 2024-07-19 VITALS
BODY MASS INDEX: 24.64 KG/M2 | RESPIRATION RATE: 18 BRPM | DIASTOLIC BLOOD PRESSURE: 60 MMHG | WEIGHT: 157 LBS | HEIGHT: 67 IN | TEMPERATURE: 97.8 F | OXYGEN SATURATION: 99 % | SYSTOLIC BLOOD PRESSURE: 100 MMHG | HEART RATE: 68 BPM

## 2024-07-19 DIAGNOSIS — Z00.00 ANNUAL PHYSICAL EXAM: Primary | ICD-10-CM

## 2024-07-19 DIAGNOSIS — Z91.018 FOOD ALLERGY: ICD-10-CM

## 2024-07-19 PROCEDURE — 99395 PREV VISIT EST AGE 18-39: CPT | Performed by: PHYSICIAN ASSISTANT

## 2024-07-19 PROCEDURE — 99213 OFFICE O/P EST LOW 20 MIN: CPT | Performed by: PHYSICIAN ASSISTANT

## 2024-07-19 RX ORDER — KETOCONAZOLE 20 MG/ML
SHAMPOO TOPICAL
COMMUNITY
Start: 2024-06-14

## 2024-07-19 RX ORDER — BETAMETHASONE DIPROPIONATE 0.5 MG/G
LOTION TOPICAL
COMMUNITY
Start: 2024-06-14

## 2024-07-19 RX ORDER — TRIAMCINOLONE ACETONIDE 1 MG/G
CREAM TOPICAL
COMMUNITY
Start: 2024-06-14

## 2024-07-19 NOTE — PATIENT INSTRUCTIONS
"Patient Education     Routine physical for adults   The Basics   Written by the doctors and editors at [facility]   What is a physical? -- A physical is a routine visit, or \"check-up,\" with your doctor. You might also hear it called a \"wellness visit\" or \"preventive visit.\"  During each visit, the doctor will:   Ask about your physical and mental health   Ask about your habits, behaviors, and lifestyle   Do an exam   Give you vaccines if needed   Talk to you about any medicines you take   Give advice about your health   Answer your questions  Getting regular check-ups is an important part of taking care of your health. It can help your doctor find and treat any problems you have. But it's also important for preventing health problems.  A routine physical is different from a \"sick visit.\" A sick visit is when you see a doctor because of a health concern or problem. Since physicals are scheduled ahead of time, you can think about what you want to ask the doctor.  How often should I get a physical? -- It depends on your age and health. In general, for people age 21 years and older:   If you are younger than 50 years, you might be able to get a physical every 3 years.   If you are 50 years or older, your doctor might recommend a physical every year.  If you have an ongoing health condition, like diabetes or high blood pressure, your doctor will probably want to see you more often.  What happens during a physical? -- In general, each visit will include:   Physical exam - The doctor or nurse will check your height, weight, heart rate, and blood pressure. They will also look at your eyes and ears. They will ask about how you are feeling and whether you have any symptoms that bother you.   Medicines - It's a good idea to bring a list of all the medicines you take to each doctor visit. Your doctor will talk to you about your medicines and answer any questions. Tell them if you are having any side effects that bother you. You " "should also tell them if you are having trouble paying for any of your medicines.   Habits and behaviors - This includes:   Your diet   Your exercise habits   Whether you smoke, drink alcohol, or use drugs   Whether you are sexually active   Whether you feel safe at home  Your doctor will talk to you about things you can do to improve your health and lower your risk of health problems. They will also offer help and support. For example, if you want to quit smoking, they can give you advice and might prescribe medicines. If you want to improve your diet or get more physical activity, they can help you with this, too.   Lab tests, if needed - The tests you get will depend on your age and situation. For example, your doctor might want to check your:   Cholesterol   Blood sugar   Iron level   Vaccines - The recommended vaccines will depend on your age, health, and what vaccines you already had. Vaccines are very important because they can prevent certain serious or deadly infections.   Discussion of screening - \"Screening\" means checking for diseases or other health problems before they cause symptoms. Your doctor can recommend screening based on your age, risk, and preferences. This might include tests to check for:   Cancer, such as breast, prostate, cervical, ovarian, colorectal, prostate, lung, or skin cancer   Sexually transmitted infections, such as chlamydia and gonorrhea   Mental health conditions like depression and anxiety  Your doctor will talk to you about the different types of screening tests. They can help you decide which screenings to have. They can also explain what the results might mean.   Answering questions - The physical is a good time to ask the doctor or nurse questions about your health. If needed, they can refer you to other doctors or specialists, too.  Adults older than 65 years often need other care, too. As you get older, your doctor will talk to you about:   How to prevent falling at " home   Hearing or vision tests   Memory testing   How to take your medicines safely   Making sure that you have the help and support you need at home  All topics are updated as new evidence becomes available and our peer review process is complete.  This topic retrieved from [employer] on: May 02, 2024.  Topic 077694 Version 1.0  Release: 32.4.3 - C32.122  © 2024 UpToDate, Inc. and/or its affiliates. All rights reserved.  Consumer Information Use and Disclaimer   Disclaimer: This generalized information is a limited summary of diagnosis, treatment, and/or medication information. It is not meant to be comprehensive and should be used as a tool to help the user understand and/or assess potential diagnostic and treatment options. It does NOT include all information about conditions, treatments, medications, side effects, or risks that may apply to a specific patient. It is not intended to be medical advice or a substitute for the medical advice, diagnosis, or treatment of a health care provider based on the health care provider's examination and assessment of a patient's specific and unique circumstances. Patients must speak with a health care provider for complete information about their health, medical questions, and treatment options, including any risks or benefits regarding use of medications. This information does not endorse any treatments or medications as safe, effective, or approved for treating a specific patient. UpToDate, Inc. and its affiliates disclaim any warranty or liability relating to this information or the use thereof.The use of this information is governed by the Terms of Use, available at https://www.woltersCrownPeakuwer.com/en/know/clinical-effectiveness-terms. 2024© UpToDate, Inc. and its affiliates and/or licensors. All rights reserved.  Copyright   © 2024 UpToDate, Inc. and/or its affiliates. All rights reserved.

## 2024-07-19 NOTE — PROGRESS NOTES
Adult Annual Physical  Name: Nupur Saleem      : 2005      MRN: [id number]  Encounter Provider: Lillian Platt PA-C  Encounter Date: 2024   Encounter department: [facility]    Assessment & Plan   1. Annual physical exam  2. Food allergy  Assessment & Plan:  Check allergy testing/celiac testing at patient's request.  Orders:  -     Food Allergy Profile; Future  -     Celiac Disease Panel; Future  Immunizations and preventive care screenings were discussed with patient today. Appropriate education was printed on patient's after visit summary.    Counseling:  Dental Health: discussed importance of regular tooth brushing, flossing, and dental visits.      Depression Screening and Follow-up Plan: Patient was screened for depression during today's encounter. They screened negative with a PHQ-2 score of 0.        History of Present Illness     Adult Annual Physical:  Patient presents for annual physical. Nupur is here today for college physical.  She is requesting food allergy testing, notes that she gets a perioral rash at times, unsure why. At first, thought it was due to shrimp, but later occurred with coffee/cream.  She also is requesting celiac blood panel, noticing some GI upset after eating tomato soup. This is more likely GERD, however will check at patient and mother request..     Diet and Physical Activity:  - Diet/Nutrition: well balanced diet.  - Exercise: moderate cardiovascular exercise.    Depression Screening:  - PHQ-2 Score: 0    General Health:  - Sleep: sleeps well.  - Hearing: normal hearing left ear and normal hearing right ear.  - Vision: goes for regular eye exams and wears glasses.  - Dental: regular dental visits.    Review of Systems   Constitutional:  Negative for chills and fever.   HENT:  Negative for ear pain and sore throat.    Eyes:  Negative for pain and visual disturbance.   Respiratory:  Negative for cough and shortness of breath.    Cardiovascular:  Negative for  "chest pain and palpitations.   Gastrointestinal:  Negative for abdominal pain and vomiting.   Genitourinary:  Negative for dysuria and hematuria.   Musculoskeletal:  Negative for arthralgias and back pain.   Skin:  Negative for color change and rash.   Neurological:  Negative for seizures and syncope.   All other systems reviewed and are negative.        Objective     /60 (BP Location: Left arm, Patient Position: Sitting, Cuff Size: Adult)   Pulse 68   Temp 97.8 °F (36.6 °C) (Temporal)   Resp 18   Ht 5' 7\" (1.702 m)   Wt 71.2 kg (157 lb)   SpO2 99%   BMI 24.59 kg/m²     Physical Exam  Vitals reviewed.   Constitutional:       General: She is not in acute distress.     Appearance: She is well-developed. She is not diaphoretic.   HENT:      Head: Normocephalic and atraumatic.      Right Ear: Hearing, tympanic membrane, ear canal and external ear normal.      Left Ear: Hearing, tympanic membrane, ear canal and external ear normal.      Nose: Nose normal.      Mouth/Throat:      Mouth: Mucous membranes are moist.      Pharynx: Oropharynx is clear. Uvula midline. No oropharyngeal exudate.   Eyes:      General: No scleral icterus.        Right eye: No discharge.         Left eye: No discharge.      Conjunctiva/sclera: Conjunctivae normal.   Neck:      Thyroid: No thyromegaly.      Vascular: No carotid bruit.   Cardiovascular:      Rate and Rhythm: Normal rate and regular rhythm.      Heart sounds: Normal heart sounds. No murmur heard.  Pulmonary:      Effort: Pulmonary effort is normal. No respiratory distress.      Breath sounds: Normal breath sounds. No wheezing.   Abdominal:      General: Bowel sounds are normal. There is no distension.      Palpations: Abdomen is soft. There is no mass.      Tenderness: There is no abdominal tenderness. There is no guarding or rebound.   Musculoskeletal:         General: No tenderness. Normal range of motion.      Cervical back: Neck supple.   Lymphadenopathy:      " Cervical: No cervical adenopathy.   Skin:     General: Skin is warm and dry.      Findings: No erythema or rash.   Neurological:      Mental Status: She is alert and oriented to person, place, and time.   Psychiatric:         Behavior: Behavior normal.         Thought Content: Thought content normal.         Judgment: Judgment normal.

## 2024-07-22 ENCOUNTER — CLINICAL SUPPORT (OUTPATIENT)
Dept: FAMILY MEDICINE CLINIC | Facility: CLINIC | Age: 19
End: 2024-07-22
Payer: COMMERCIAL

## 2024-07-22 ENCOUNTER — APPOINTMENT (OUTPATIENT)
Dept: LAB | Facility: MEDICAL CENTER | Age: 19
End: 2024-07-22
Payer: COMMERCIAL

## 2024-07-22 DIAGNOSIS — Z91.018 FOOD ALLERGY: ICD-10-CM

## 2024-07-22 DIAGNOSIS — Z11.1 ENCOUNTER FOR PPD TEST: Primary | ICD-10-CM

## 2024-07-22 PROCEDURE — 86231 EMA EACH IG CLASS: CPT

## 2024-07-22 PROCEDURE — 86364 TISS TRNSGLTMNASE EA IG CLAS: CPT

## 2024-07-22 PROCEDURE — 86258 DGP ANTIBODY EACH IG CLASS: CPT

## 2024-07-22 PROCEDURE — 82785 ASSAY OF IGE: CPT

## 2024-07-22 PROCEDURE — 86580 TB INTRADERMAL TEST: CPT | Performed by: PHYSICIAN ASSISTANT

## 2024-07-22 PROCEDURE — 86003 ALLG SPEC IGE CRUDE XTRC EA: CPT

## 2024-07-22 PROCEDURE — 82784 ASSAY IGA/IGD/IGG/IGM EACH: CPT

## 2024-07-22 PROCEDURE — 36415 COLL VENOUS BLD VENIPUNCTURE: CPT

## 2024-07-23 LAB
ALMOND IGE QN: <0.1 KUA/I
CASHEW NUT IGE QN: <0.1 KUA/I
CODFISH IGE QN: <0.1 KUA/I
EGG WHITE IGE QN: <0.1 KUA/I
ENDOMYSIUM IGA SER QL: NEGATIVE
GLIADIN PEPTIDE IGA SER-ACNC: 2 UNITS (ref 0–19)
GLIADIN PEPTIDE IGG SER-ACNC: 1 UNITS (ref 0–19)
GLUTEN IGE QN: <0.1 KUA/I
HAZELNUT IGE QN: <0.1 KUA/L
IGA SERPL-MCNC: 142 MG/DL (ref 87–352)
MILK IGE QN: <0.1 KUA/I
PEANUT IGE QN: <0.1 KUA/I
SALMON IGE QN: <0.1 KUA/I
SCALLOP IGE QN: <0.1 KUA/L
SESAME SEED IGE QN: <0.1 KUA/I
SHRIMP IGE QN: <0.1 KUA/L
SOYBEAN IGE QN: <0.1 KUA/I
TOTAL IGE SMQN RAST: 17.3 KU/L (ref 0–113)
TTG IGA SER-ACNC: <2 U/ML (ref 0–3)
TTG IGG SER-ACNC: 4 U/ML (ref 0–5)
TUNA IGE QN: <0.1 KUA/I
WALNUT IGE QN: <0.1 KUA/I
WHEAT IGE QN: <0.1 KUA/I

## 2024-07-24 DIAGNOSIS — Z91.018 FOOD ALLERGY: Primary | ICD-10-CM

## 2024-07-24 LAB
INDURATION: 0 MM
TB SKIN TEST: NEGATIVE

## 2024-07-24 RX ORDER — EPINEPHRINE 0.3 MG/.3ML
0.3 INJECTION SUBCUTANEOUS ONCE
Qty: 0.6 ML | Refills: 0 | Status: SHIPPED | OUTPATIENT
Start: 2024-07-24 | End: 2024-07-24

## 2024-08-28 ENCOUNTER — TELEPHONE (OUTPATIENT)
Age: 19
End: 2024-08-28

## 2024-08-28 DIAGNOSIS — Z30.41 ENCOUNTER FOR SURVEILLANCE OF CONTRACEPTIVE PILLS: ICD-10-CM

## 2024-08-28 RX ORDER — DROSPIRENONE AND ETHINYL ESTRADIOL 0.02-3(28)
1 KIT ORAL DAILY
Qty: 84 TABLET | Refills: 1 | Status: SHIPPED | OUTPATIENT
Start: 2024-08-28

## 2024-08-28 NOTE — TELEPHONE ENCOUNTER
Patients mom called and her daughter is at college and needs her bc. She asked if she can do a virtual and she is available wed afternoons.  Please call mom back at [phone] . Thank you

## 2024-08-28 NOTE — TELEPHONE ENCOUNTER
Called and spoke with Martha, patient's mom, scheduled Nupur for 11/20, virtual visit ok per Dr. Nunez.

## 2024-08-28 NOTE — TELEPHONE ENCOUNTER
Patients mom called and her daughter is at college and needs her bc. She asked if she can do a virtual and she is available wed afternoons.  Please call mom back at [phone] . Thank you

## 2024-11-20 ENCOUNTER — TELEMEDICINE (OUTPATIENT)
Dept: OBGYN CLINIC | Facility: CLINIC | Age: 19
End: 2024-11-20
Payer: COMMERCIAL

## 2024-11-20 DIAGNOSIS — Z30.41 ENCOUNTER FOR SURVEILLANCE OF CONTRACEPTIVE PILLS: ICD-10-CM

## 2024-11-20 PROCEDURE — 99213 OFFICE O/P EST LOW 20 MIN: CPT | Performed by: OBSTETRICS & GYNECOLOGY

## 2024-11-20 RX ORDER — DROSPIRENONE AND ETHINYL ESTRADIOL 0.02-3(28)
1 KIT ORAL DAILY
Qty: 84 TABLET | Refills: 3 | Status: SHIPPED | OUTPATIENT
Start: 2024-11-20

## 2024-11-20 NOTE — PROGRESS NOTES
Virtual Regular Visit  Name: Nupur Saleem      : 2005      MRN: [id number]  Encounter Provider: Trish Nunez MD  Encounter Date: 2024   Encounter department: [facility] [place]    Assessment & Plan  Encounter for surveillance of contraceptive pills    Orders:    drospirenone-ethinyl estradiol (Loryna) 3-0.02 MG per tablet; Take 1 tablet by mouth daily  - Return in 1yr for yearly, pap initiation    __________________________________________________      Encounter provider Trish Nunez MD    The patient was identified by name and date of birth. Nupur Saleem was informed that this is a telemedicine visit and that the visit is being conducted through the Epic Embedded platform. She agrees to proceed..  My office door was closed. No one else was in the room.  She acknowledged consent and understanding of privacy and security of the video platform. The patient has agreed to participate and understands they can discontinue the visit at any time.    Verification of patient location:  Patient is located at Other in the following state in which I hold an active license [place] :    Patient is aware this is a billable service.     History of Present Illness     HPI    Ms Saleem is a 19 y.o.  presenting for yearly f/u in setting of OCP use.     Patient reports overall happy with contraceptive. Reports menses are regular, monthly, lasting approx 4d in duration. Start approx 2d after inactive pills, but predictable in this timing. No significant cramping or heavy flow.  No noted side effects. Premenstrual acne still, but otherwise well managed other times of the month. Sexually active. No concern for STD exposure. No GYN concerns; denies discharge, irritation, rashes, lesions, or pelvic pain. No breast concerns. No HTN noted on office visits with other providers over last year.     Reviewed pap initiation at age 20.     Review of Systems   Constitutional:  Negative for chills,  fatigue and fever.   Respiratory:  Negative for shortness of breath.    Cardiovascular:  Negative for chest pain and palpitations.   Gastrointestinal:  Negative for abdominal distention, abdominal pain, nausea and vomiting.   Genitourinary:  Negative for dyspareunia, dysuria, flank pain, genital sores, menstrual problem, pelvic pain, vaginal bleeding, vaginal discharge and vaginal pain.   Skin:  Negative for rash and wound.   Neurological:  Negative for light-headedness and headaches.   Psychiatric/Behavioral:  Negative for dysphoric mood. The patient is not nervous/anxious.        Objective   There were no vitals taken for this visit.    Physical Exam  Constitutional:       General: She is not in acute distress.     Appearance: Normal appearance. She is well-developed. She is not ill-appearing, toxic-appearing or diaphoretic.   Eyes:      General: No scleral icterus.        Right eye: No discharge.         Left eye: No discharge.      Conjunctiva/sclera: Conjunctivae normal.   Pulmonary:      Effort: Pulmonary effort is normal. No respiratory distress.   Skin:     Coloration: Skin is not jaundiced or pale.      Findings: No bruising or erythema.   Neurological:      Mental Status: She is alert.   Psychiatric:         Mood and Affect: Mood normal.         Behavior: Behavior normal.         Thought Content: Thought content normal.         Judgment: Judgment normal.         Visit Time  Total Visit Duration: 10min

## 2024-11-30 ENCOUNTER — HOSPITAL ENCOUNTER (EMERGENCY)
Facility: HOSPITAL | Age: 19
Discharge: HOME | End: 2024-11-30
Attending: INTERNAL MEDICINE
Payer: COMMERCIAL

## 2024-11-30 VITALS
HEART RATE: 63 BPM | RESPIRATION RATE: 18 BRPM | DIASTOLIC BLOOD PRESSURE: 70 MMHG | OXYGEN SATURATION: 100 % | SYSTOLIC BLOOD PRESSURE: 119 MMHG | WEIGHT: 145 LBS | TEMPERATURE: 98.1 F | BODY MASS INDEX: 22.71 KG/M2

## 2024-11-30 DIAGNOSIS — K29.70 GASTRITIS: Primary | ICD-10-CM

## 2024-11-30 LAB
ALBUMIN SERPL BCG-MCNC: 4 G/DL (ref 3.5–5)
ALP SERPL-CCNC: 50 U/L (ref 34–104)
ALT SERPL W P-5'-P-CCNC: 14 U/L (ref 7–52)
ANION GAP SERPL CALCULATED.3IONS-SCNC: 5 MMOL/L (ref 4–13)
AST SERPL W P-5'-P-CCNC: 14 U/L (ref 13–39)
BASOPHILS # BLD AUTO: 0.03 THOUSANDS/ΜL (ref 0–0.1)
BASOPHILS NFR BLD AUTO: 0 % (ref 0–1)
BILIRUB SERPL-MCNC: 0.33 MG/DL (ref 0.2–1)
BUN SERPL-MCNC: 14 MG/DL (ref 5–25)
CALCIUM SERPL-MCNC: 8.9 MG/DL (ref 8.4–10.2)
CHLORIDE SERPL-SCNC: 105 MMOL/L (ref 96–108)
CO2 SERPL-SCNC: 28 MMOL/L (ref 21–32)
CREAT SERPL-MCNC: 0.76 MG/DL (ref 0.6–1.3)
EOSINOPHIL # BLD AUTO: 0.06 THOUSAND/ΜL (ref 0–0.61)
EOSINOPHIL NFR BLD AUTO: 1 % (ref 0–6)
ERYTHROCYTE [DISTWIDTH] IN BLOOD BY AUTOMATED COUNT: 12 % (ref 11.6–15.1)
EXT PREGNANCY TEST URINE: NEGATIVE
EXT. CONTROL: NORMAL
GFR SERPL CREATININE-BSD FRML MDRD: 113 ML/MIN/1.73SQ M
GLUCOSE SERPL-MCNC: 108 MG/DL (ref 65–140)
HCT VFR BLD AUTO: 43.7 % (ref 34.8–46.1)
HGB BLD-MCNC: 14.5 G/DL (ref 11.5–15.4)
IMM GRANULOCYTES # BLD AUTO: 0.03 THOUSAND/UL (ref 0–0.2)
IMM GRANULOCYTES NFR BLD AUTO: 0 % (ref 0–2)
LIPASE SERPL-CCNC: 23 U/L (ref 11–82)
LYMPHOCYTES # BLD AUTO: 1.07 THOUSANDS/ΜL (ref 0.6–4.47)
LYMPHOCYTES NFR BLD AUTO: 10 % (ref 14–44)
MCH RBC QN AUTO: 29.1 PG (ref 26.8–34.3)
MCHC RBC AUTO-ENTMCNC: 33.2 G/DL (ref 31.4–37.4)
MCV RBC AUTO: 88 FL (ref 82–98)
MONOCYTES # BLD AUTO: 0.41 THOUSAND/ΜL (ref 0.17–1.22)
MONOCYTES NFR BLD AUTO: 4 % (ref 4–12)
NEUTROPHILS # BLD AUTO: 9.17 THOUSANDS/ΜL (ref 1.85–7.62)
NEUTS SEG NFR BLD AUTO: 85 % (ref 43–75)
NRBC BLD AUTO-RTO: 0 /100 WBCS
PLATELET # BLD AUTO: 239 THOUSANDS/UL (ref 149–390)
PMV BLD AUTO: 9.8 FL (ref 8.9–12.7)
POTASSIUM SERPL-SCNC: 4.1 MMOL/L (ref 3.5–5.3)
PROT SERPL-MCNC: 6.7 G/DL (ref 6.4–8.4)
RBC # BLD AUTO: 4.98 MILLION/UL (ref 3.81–5.12)
SODIUM SERPL-SCNC: 138 MMOL/L (ref 135–147)
WBC # BLD AUTO: 10.77 THOUSAND/UL (ref 4.31–10.16)

## 2024-11-30 PROCEDURE — 99283 EMERGENCY DEPT VISIT LOW MDM: CPT

## 2024-11-30 PROCEDURE — 96375 TX/PRO/DX INJ NEW DRUG ADDON: CPT

## 2024-11-30 PROCEDURE — 96374 THER/PROPH/DIAG INJ IV PUSH: CPT

## 2024-11-30 PROCEDURE — 99284 EMERGENCY DEPT VISIT MOD MDM: CPT | Performed by: INTERNAL MEDICINE

## 2024-11-30 PROCEDURE — 81025 URINE PREGNANCY TEST: CPT | Performed by: INTERNAL MEDICINE

## 2024-11-30 PROCEDURE — 85025 COMPLETE CBC W/AUTO DIFF WBC: CPT | Performed by: INTERNAL MEDICINE

## 2024-11-30 PROCEDURE — 96361 HYDRATE IV INFUSION ADD-ON: CPT

## 2024-11-30 PROCEDURE — 36415 COLL VENOUS BLD VENIPUNCTURE: CPT | Performed by: INTERNAL MEDICINE

## 2024-11-30 PROCEDURE — 80053 COMPREHEN METABOLIC PANEL: CPT | Performed by: INTERNAL MEDICINE

## 2024-11-30 PROCEDURE — 83690 ASSAY OF LIPASE: CPT | Performed by: INTERNAL MEDICINE

## 2024-11-30 RX ORDER — PANTOPRAZOLE SODIUM 40 MG/10ML
40 INJECTION, POWDER, LYOPHILIZED, FOR SOLUTION INTRAVENOUS ONCE
Status: COMPLETED | OUTPATIENT
Start: 2024-11-30 | End: 2024-11-30

## 2024-11-30 RX ORDER — ONDANSETRON 2 MG/ML
4 INJECTION INTRAMUSCULAR; INTRAVENOUS ONCE
Status: COMPLETED | OUTPATIENT
Start: 2024-11-30 | End: 2024-11-30

## 2024-11-30 RX ADMIN — ONDANSETRON 4 MG: 2 INJECTION INTRAMUSCULAR; INTRAVENOUS at 15:10

## 2024-11-30 RX ADMIN — PANTOPRAZOLE SODIUM 40 MG: 40 INJECTION, POWDER, FOR SOLUTION INTRAVENOUS at 15:07

## 2024-11-30 RX ADMIN — SODIUM CHLORIDE 500 ML: 0.9 INJECTION, SOLUTION INTRAVENOUS at 15:03

## 2024-11-30 NOTE — ED PROVIDER NOTES
Time reflects when diagnosis was documented in both MDM as applicable and the Disposition within this note       Time User Action Codes Description Comment    11/30/2024  4:58 PM Robert Pablo Add [K29.70] Gastritis           ED Disposition       ED Disposition   Discharge    Condition   Stable    Date/Time   Sat Nov 30, 2024  4:58 PM    Comment   Nupur Saleem discharge to home/self care.                   Assessment & Plan       Medical Decision Making  19-year-old from college, apparently had some abdominal pain and vomiting.  She describes vomiting up a little bit of blood.  She was seen at an emergency room out there, treated with Zofran and discharged home.  Today she woke up, did not eat anything, started feeling nauseous again and felt like she was going vomit again.  She did vomit once.  No blood in that vomitus.  Seemed bilious, and frothy per mother.  There is been no fever, no diarrhea.  No recent travel.  A lot of stress at school from school finals    Patient has no significant past medical history.  Suspect mild amount of blood, which was scant per patient related to either gastritis or a small for his esophagus.  For now check labs, her pain is epigastric in nature but not consistent.  Some radiation to the back and side.    Amount and/or Complexity of Data Reviewed  Labs: ordered.     Details: Labs all within normal limits    Risk  Prescription drug management.  Risk Details: Patient was feeling better, after IV hydration, Zofran and Protonix.  She was placed on omeprazole daily for 2 weeks, and then switched to Pepcid.  Instructed follow-up with her primary care doctor, consider GI evaluation.  Likely gastritis brought on by the stress of school.  Abdominal exam otherwise was benign with no Cuellar sign, no McBurney sign.  There is no fever associated with this either.  Family agrees to this follow-up, and will be sure to follow-up with her primary care.             Medications   sodium chloride  "0.9 % bolus 500 mL (0 mL Intravenous Stopped 11/30/24 1633)   pantoprazole (PROTONIX) injection 40 mg (40 mg Intravenous Given 11/30/24 1507)   ondansetron (ZOFRAN) injection 4 mg (4 mg Intravenous Given 11/30/24 1510)       ED Risk Strat Scores             CRAFFT      Flowsheet Row Most Recent Value   CRAFFT Initial Screen: During the past 12 months, did you:    1. Drink any alcohol (more than a few sips)?  No Filed at: 11/30/2024 1424   2. Smoke any marijuana or hashish No Filed at: 11/30/2024 1427   3. Use anything else to get high? (\"anything else\" includes illegal drugs, over the counter and prescription drugs, and things that you sniff or 'quiroga')? No Filed at: 11/30/2024 1427                                          History of Present Illness       Chief Complaint   Patient presents with    Abdominal Pain     Lower abdominal burning sensation and episode x1 of vomiting this morning.        History reviewed. No pertinent past medical history.   Past Surgical History:   Procedure Laterality Date    BUNIONECTOMY Left 07/11/2023    MO ARTHRS KNE SURG W/MENISCECTOMY MED/LAT W/SHVG Left 1/4/2024    Procedure: ARTHROSCOPY KNEE w/ lateral meniscus repair;  Surgeon: Akil Fuentes MD;  Location: [facility];  Service: Orthopedics      Family History   Problem Relation Age of Onset    Thyroid disease Mother     Hypertension Father     Asthma Sister       Social History     Tobacco Use    Smoking status: Never     Passive exposure: Never    Smokeless tobacco: Never   Vaping Use    Vaping status: Never Used   Substance Use Topics    Alcohol use: Never    Drug use: Never      E-Cigarette/Vaping    E-Cigarette Use Never User       E-Cigarette/Vaping Substances    Nicotine No     THC No     CBD No     Flavoring No     Other No     Unknown No       I have reviewed and agree with the history as documented.     19-year-old from college, apparently had some abdominal pain and vomiting.  She describes vomiting up a little " bit of blood.  She was seen at an emergency room out there, treated with Zofran and discharged home.  Today she woke up, did not eat anything, started feeling nauseous again and felt like she was going vomit again.  She did vomit once.  No blood in that vomitus.  Seemed bilious, and frothy per mother.  Due for her period today.        Review of Systems   Constitutional:  Negative for chills and fever.   HENT:  Negative for congestion and sore throat.    Eyes:  Negative for pain and visual disturbance.   Respiratory:  Negative for cough and shortness of breath.    Cardiovascular:  Negative for chest pain and leg swelling.   Gastrointestinal:  Positive for abdominal pain, nausea and vomiting. Negative for diarrhea.        Heart burn     Genitourinary:  Negative for dysuria, flank pain, frequency and urgency.   Musculoskeletal:  Positive for back pain. Negative for arthralgias, myalgias and neck pain.   Skin:  Negative for rash.   Neurological:  Negative for dizziness, weakness, light-headedness and headaches.   Hematological: Negative.    Psychiatric/Behavioral:  Negative for agitation, confusion and suicidal ideas. The patient is not nervous/anxious.    All other systems reviewed and are negative.          Objective       ED Triage Vitals [11/30/24 1424]   Temperature Pulse Blood Pressure Respirations SpO2 Patient Position - Orthostatic VS   98.1 °F (36.7 °C) 67 124/73 18 99 % Sitting      Temp Source Heart Rate Source BP Location FiO2 (%) Pain Score    Temporal Monitor Left arm -- 2      Vitals      Date and Time Temp Pulse SpO2 Resp BP Pain Score FACES Pain Rating User   11/30/24 1630 -- 63 100 % 18 119/70 -- -- [facility]   11/30/24 1600 -- 67 98 % -- 124/59 -- -- [facility]   11/30/24 1424 98.1 °F (36.7 °C) 67 99 % 18 124/73 2 -- DK            Physical Exam  Vitals and nursing note reviewed.   Constitutional:       General: She is not in acute distress.     Appearance: She is well-developed.   HENT:      Head: Normocephalic  and atraumatic.   Eyes:      Conjunctiva/sclera: Conjunctivae normal.   Cardiovascular:      Rate and Rhythm: Normal rate and regular rhythm.      Heart sounds: No murmur heard.  Pulmonary:      Effort: Pulmonary effort is normal. No respiratory distress.      Breath sounds: Normal breath sounds.   Abdominal:      Palpations: Abdomen is soft.      Tenderness: There is abdominal tenderness in the epigastric area.   Genitourinary:     Adnexa: Right adnexa normal.   Musculoskeletal:         General: No swelling.      Cervical back: Neck supple.   Skin:     General: Skin is warm and dry.      Capillary Refill: Capillary refill takes less than 2 seconds.   Neurological:      Mental Status: She is alert.   Psychiatric:         Mood and Affect: Mood normal.         Results Reviewed       Procedure Component Value Units Date/Time    CBC and differential [[id number]]  (Abnormal) Collected: 11/30/24 1458    Lab Status: Final result Specimen: Blood from Arm, Left Updated: 11/30/24 1626     WBC 10.77 Thousand/uL      RBC 4.98 Million/uL      Hemoglobin 14.5 g/dL      Hematocrit 43.7 %      MCV 88 fL      MCH 29.1 pg      MCHC 33.2 g/dL      RDW 12.0 %      MPV 9.8 fL      Platelets 239 Thousands/uL      nRBC 0 /100 WBCs      Segmented % 85 %      Immature Grans % 0 %      Lymphocytes % 10 %      Monocytes % 4 %      Eosinophils Relative 1 %      Basophils Relative 0 %      Absolute Neutrophils 9.17 Thousands/µL      Absolute Immature Grans 0.03 Thousand/uL      Absolute Lymphocytes 1.07 Thousands/µL      Absolute Monocytes 0.41 Thousand/µL      Eosinophils Absolute 0.06 Thousand/µL      Basophils Absolute 0.03 Thousands/µL     Comprehensive metabolic panel [[id number]] Collected: 11/30/24 1458    Lab Status: Final result Specimen: Blood from Arm, Left Updated: 11/30/24 1523     Sodium 138 mmol/L      Potassium 4.1 mmol/L      Chloride 105 mmol/L      CO2 28 mmol/L      ANION GAP 5 mmol/L      BUN 14 mg/dL      Creatinine 0.76  mg/dL      Glucose 108 mg/dL      Calcium 8.9 mg/dL      AST 14 U/L      ALT 14 U/L      Alkaline Phosphatase 50 U/L      Total Protein 6.7 g/dL      Albumin 4.0 g/dL      Total Bilirubin 0.33 mg/dL      eGFR 113 ml/min/1.73sq m     Narrative:      National Kidney Disease Foundation guidelines for Chronic Kidney Disease (CKD):     Stage 1 with normal or high GFR (GFR > 90 mL/min/1.73 square meters)    Stage 2 Mild CKD (GFR = 60-89 mL/min/1.73 square meters)    Stage 3A Moderate CKD (GFR = 45-59 mL/min/1.73 square meters)    Stage 3B Moderate CKD (GFR = 30-44 mL/min/1.73 square meters)    Stage 4 Severe CKD (GFR = 15-29 mL/min/1.73 square meters)    Stage 5 End Stage CKD (GFR <15 mL/min/1.73 square meters)  Note: GFR calculation is accurate only with a steady state creatinine    Lipase [[id number]]  (Normal) Collected: 11/30/24 1458    Lab Status: Final result Specimen: Blood from Arm, Left Updated: 11/30/24 1523     Lipase 23 u/L     POCT pregnancy, urine [[id number]]  (Normal) Collected: 11/30/24 1507    Lab Status: Final result Specimen: Urine Updated: 11/30/24 1507     EXT Preg Test, Ur Negative     Control Valid            No orders to display       Procedures    ED Medication and Procedure Management   Prior to Admission Medications   Prescriptions Last Dose Informant Patient Reported? Taking?   EPINEPHrine (EPIPEN) 0.3 mg/0.3 mL SOAJ   No No   Sig: Inject 0.3 mL (0.3 mg total) into a muscle once for 1 dose   betamethasone dipropionate 0.05 % lotion   Yes No   Sig: APPLY TOPICALLY TO AFFECTED AREAS OF SCALP DAILY AS NEEDED   drospirenone-ethinyl estradiol (Loryna) 3-0.02 MG per tablet 11/29/2024  No Yes   Sig: Take 1 tablet by mouth daily   ketoconazole (NIZORAL) 2 % shampoo   Yes No   Sig: APPLY LATHER TO SCALP, LEAVING ON FOR 5 MINUTES BEFORE RINSING. USE 2-3 TIMES PER WEEK   triamcinolone (KENALOG) 0.1 % cream   Yes No   Sig: APPLY TOPICALLY TO AFFECTED AREAS OF ECZEMA ARMS, LEGS AND TRUNK DAILY AS NEEDED       Facility-Administered Medications: None     Discharge Medication List as of 11/30/2024  5:00 PM        CONTINUE these medications which have NOT CHANGED    Details   drospirenone-ethinyl estradiol (Loryna) 3-0.02 MG per tablet Take 1 tablet by mouth daily, Starting Wed 11/20/2024, Normal      betamethasone dipropionate 0.05 % lotion APPLY TOPICALLY TO AFFECTED AREAS OF SCALP DAILY AS NEEDED, Historical Med      EPINEPHrine (EPIPEN) 0.3 mg/0.3 mL SOAJ Inject 0.3 mL (0.3 mg total) into a muscle once for 1 dose, Starting Wed 7/24/2024, Normal      ketoconazole (NIZORAL) 2 % shampoo APPLY LATHER TO SCALP, LEAVING ON FOR 5 MINUTES BEFORE RINSING. USE 2-3 TIMES PER WEEK, Historical Med      triamcinolone (KENALOG) 0.1 % cream APPLY TOPICALLY TO AFFECTED AREAS OF ECZEMA ARMS, LEGS AND TRUNK DAILY AS NEEDED, Historical Med           No discharge procedures on file.  ED SEPSIS DOCUMENTATION   Time reflects when diagnosis was documented in both MDM as applicable and the Disposition within this note       Time User Action Codes Description Comment    11/30/2024  4:58 PM Robert Pablo [K29.70] Gastritis                  Robert Pablo DO  11/30/24 2128

## 2024-11-30 NOTE — DISCHARGE INSTRUCTIONS
Take omeprazole 40 mg daily for 2 weeks, then discontinue and go to Pepcid 20 mg daily.  Follow-up with your primary care, consider GI evaluation.  Return if you develop fever or increasing pain  Take Zofran as needed for nausea.

## 2024-12-02 ENCOUNTER — VBI (OUTPATIENT)
Dept: FAMILY MEDICINE CLINIC | Facility: CLINIC | Age: 19
End: 2024-12-02

## 2024-12-02 NOTE — TELEPHONE ENCOUNTER
12/02/24 9:16 AM    Patient contacted post ED visit, [facility] department spoke with patient/caregiver and outreach was successful.    Thank you.  Linette Jenkins MA  [facility]

## 2024-12-17 ENCOUNTER — OFFICE VISIT (OUTPATIENT)
Dept: FAMILY MEDICINE CLINIC | Facility: CLINIC | Age: 19
End: 2024-12-17
Payer: COMMERCIAL

## 2024-12-17 VITALS
SYSTOLIC BLOOD PRESSURE: 100 MMHG | RESPIRATION RATE: 16 BRPM | OXYGEN SATURATION: 98 % | TEMPERATURE: 97.8 F | HEART RATE: 94 BPM | HEIGHT: 67 IN | WEIGHT: 162 LBS | BODY MASS INDEX: 25.43 KG/M2 | DIASTOLIC BLOOD PRESSURE: 74 MMHG

## 2024-12-17 DIAGNOSIS — K21.9 GASTROESOPHAGEAL REFLUX DISEASE, UNSPECIFIED WHETHER ESOPHAGITIS PRESENT: Primary | ICD-10-CM

## 2024-12-17 DIAGNOSIS — K59.00 CONSTIPATION, UNSPECIFIED CONSTIPATION TYPE: ICD-10-CM

## 2024-12-17 PROCEDURE — 99213 OFFICE O/P EST LOW 20 MIN: CPT | Performed by: PHYSICIAN ASSISTANT

## 2024-12-17 RX ORDER — OMEPRAZOLE 40 MG/1
40 CAPSULE, DELAYED RELEASE ORAL
Qty: 30 CAPSULE | Refills: 0 | Status: SHIPPED | OUTPATIENT
Start: 2024-12-17 | End: 2025-06-15

## 2024-12-17 RX ORDER — OMEPRAZOLE 20 MG/1
20 TABLET, DELAYED RELEASE ORAL DAILY
COMMUNITY
End: 2024-12-17

## 2024-12-17 RX ORDER — ONDANSETRON 4 MG/1
TABLET, ORALLY DISINTEGRATING ORAL
COMMUNITY
Start: 2024-11-23

## 2024-12-17 RX ORDER — FAMOTIDINE 20 MG/1
20 TABLET, FILM COATED ORAL 2 TIMES DAILY
Qty: 60 TABLET | Refills: 0 | Status: SHIPPED | OUTPATIENT
Start: 2024-12-17 | End: 2025-12-12

## 2024-12-17 NOTE — PROGRESS NOTES
"Name: Nupur Saleem      : 2005      MRN: [id number]  Encounter Provider: Lillian Platt PA-C  Encounter Date: 2024   Encounter department: [facility]  :  Assessment & Plan  Gastroesophageal reflux disease, unspecified whether esophagitis present  Continue omeprazole 40 mg daily. Will add famotidine for 2 weeks, avoid trigger foods. Pt to RTO in 2 weeks time, will then try to remove famotidine. If then does well, try to remove omeprazole. If does not do continue to do well, would refer to GI and may need EGD.  Orders:  •  omeprazole (PriLOSEC) 40 MG capsule; Take 1 capsule (40 mg total) by mouth daily before breakfast  •  famotidine (PEPCID) 20 mg tablet; Take 1 tablet (20 mg total) by mouth 2 (two) times a day    Constipation, unspecified constipation type  Recommend daily Miralax, drink plenty of water.             Depression Screening and Follow-up Plan: Patient was screened for depression during today's encounter. They screened negative with a PHQ-2 score of 0.      History of Present Illness     Nupur is here today complaining of gastritis/GERD symptoms. She's had a few episodes of vomiting \"bile,\" she describes as yellow and foamy. Notes worse with coffee or red sauce. She takes omeprazole 40 mg with limited relief. Also notes some constipation. Describes pain in LUQ, describes as \"burning\" when she feels it.      Review of Systems   Constitutional:  Negative for chills and fever.   HENT:  Negative for ear pain and sore throat.    Eyes:  Negative for pain and visual disturbance.   Respiratory:  Negative for cough and shortness of breath.    Cardiovascular:  Negative for chest pain and palpitations.   Gastrointestinal:  Positive for abdominal pain, constipation, nausea and vomiting.   Genitourinary:  Negative for dysuria and hematuria.   Musculoskeletal:  Negative for arthralgias and back pain.   Skin:  Negative for color change and rash.   Neurological:  Negative for seizures and syncope. " "  All other systems reviewed and are negative.      Objective   /74 (BP Location: Left arm, Patient Position: Sitting, Cuff Size: Standard)   Pulse 94   Temp 97.8 °F (36.6 °C) (Temporal)   Resp 16   Ht 5' 7\" (1.702 m)   Wt 73.5 kg (162 lb)   LMP 10/31/2024   SpO2 98%   BMI 25.37 kg/m²      Physical Exam  Vitals reviewed.   Constitutional:       General: She is not in acute distress.     Appearance: She is well-developed. She is not diaphoretic.   HENT:      Head: Normocephalic and atraumatic.      Right Ear: Hearing, tympanic membrane, ear canal and external ear normal.      Left Ear: Hearing, tympanic membrane, ear canal and external ear normal.      Nose: Nose normal.      Mouth/Throat:      Mouth: Mucous membranes are moist.      Pharynx: Oropharynx is clear. Uvula midline. No oropharyngeal exudate.   Eyes:      General: No scleral icterus.        Right eye: No discharge.         Left eye: No discharge.      Conjunctiva/sclera: Conjunctivae normal.   Neck:      Thyroid: No thyromegaly.      Vascular: No carotid bruit.   Cardiovascular:      Rate and Rhythm: Normal rate and regular rhythm.      Heart sounds: Normal heart sounds. No murmur heard.  Pulmonary:      Effort: Pulmonary effort is normal. No respiratory distress.      Breath sounds: Normal breath sounds. No wheezing.   Abdominal:      General: Bowel sounds are normal. There is no distension.      Palpations: Abdomen is soft. There is no mass.      Tenderness: There is no abdominal tenderness. There is no guarding or rebound.   Musculoskeletal:         General: No tenderness. Normal range of motion.      Cervical back: Neck supple.   Lymphadenopathy:      Cervical: No cervical adenopathy.   Skin:     General: Skin is warm and dry.      Findings: No erythema or rash.   Neurological:      Mental Status: She is alert and oriented to person, place, and time.   Psychiatric:         Behavior: Behavior normal.         Thought Content: Thought " content normal.         Judgment: Judgment normal.

## 2025-01-08 ENCOUNTER — OFFICE VISIT (OUTPATIENT)
Dept: FAMILY MEDICINE CLINIC | Facility: CLINIC | Age: 20
End: 2025-01-08
Payer: COMMERCIAL

## 2025-01-08 VITALS
WEIGHT: 162 LBS | HEART RATE: 64 BPM | OXYGEN SATURATION: 99 % | BODY MASS INDEX: 25.43 KG/M2 | SYSTOLIC BLOOD PRESSURE: 106 MMHG | DIASTOLIC BLOOD PRESSURE: 68 MMHG | TEMPERATURE: 99.5 F | HEIGHT: 67 IN

## 2025-01-08 DIAGNOSIS — K21.9 GASTROESOPHAGEAL REFLUX DISEASE, UNSPECIFIED WHETHER ESOPHAGITIS PRESENT: Primary | ICD-10-CM

## 2025-01-08 DIAGNOSIS — R10.11 RUQ PAIN: ICD-10-CM

## 2025-01-08 PROCEDURE — 99213 OFFICE O/P EST LOW 20 MIN: CPT | Performed by: PHYSICIAN ASSISTANT

## 2025-01-08 RX ORDER — FAMOTIDINE 20 MG/1
20 TABLET, FILM COATED ORAL 2 TIMES DAILY
Qty: 60 TABLET | Refills: 1 | Status: SHIPPED | OUTPATIENT
Start: 2025-01-08 | End: 2026-01-03

## 2025-01-08 RX ORDER — OMEPRAZOLE 40 MG/1
40 CAPSULE, DELAYED RELEASE ORAL
Qty: 30 CAPSULE | Refills: 1 | Status: SHIPPED | OUTPATIENT
Start: 2025-01-08 | End: 2025-07-07

## 2025-01-08 NOTE — PROGRESS NOTES
"Name: Nupur Saleem      : 2005      MRN: [id number]  Encounter Provider: Lillian Platt PA-C  Encounter Date: 2025   Encounter department: [facility]  :  Assessment & Plan  Gastroesophageal reflux disease, unspecified whether esophagitis present  Still symptomatic, will continue medication however will refer to GI for further evaluation. Pt going back to school in [place], it is easier for her to get to [place]. Will refer to GI in [place] at [place].  Orders:  •  famotidine (PEPCID) 20 mg tablet; Take 1 tablet (20 mg total) by mouth 2 (two) times a day  •  omeprazole (PriLOSEC) 40 MG capsule; Take 1 capsule (40 mg total) by mouth daily before breakfast  •  Ambulatory Referral to Gastroenterology; Future    RUQ pain  Pt now with RUQ pain once daily, consider biliary colic. Check RUQ US.  Orders:  •  US right upper quadrant; Future           History of Present Illness     Nupur is here today for 2 week follow up.   States that medication helped initially, now does not seem to be giving much of a benefit. She has not vomited. She also notes that she is getting some RUQ pain once daily.      Review of Systems   Constitutional:  Negative for chills and fever.   HENT:  Negative for ear pain and sore throat.    Eyes:  Negative for pain and visual disturbance.   Respiratory:  Negative for cough and shortness of breath.    Cardiovascular:  Negative for chest pain and palpitations.   Gastrointestinal:  Positive for abdominal pain and nausea. Negative for vomiting.   Genitourinary:  Negative for dysuria and hematuria.   Musculoskeletal:  Negative for arthralgias and back pain.   Skin:  Negative for color change and rash.   Neurological:  Negative for seizures and syncope.   All other systems reviewed and are negative.      Objective   /68 (BP Location: Left arm, Patient Position: Sitting)   Pulse 64   Temp 99.5 °F (37.5 °C)   Ht 5' 7\" (1.702 m)   Wt 73.5 kg (162 lb)   SpO2 99%   BMI " 25.37 kg/m²      Physical Exam  Vitals reviewed.   Constitutional:       General: She is not in acute distress.     Appearance: She is well-developed. She is not diaphoretic.   HENT:      Head: Normocephalic and atraumatic.      Right Ear: Hearing, tympanic membrane, ear canal and external ear normal.      Left Ear: Hearing, tympanic membrane, ear canal and external ear normal.      Nose: Nose normal.      Mouth/Throat:      Mouth: Mucous membranes are moist.      Pharynx: Oropharynx is clear. Uvula midline. No oropharyngeal exudate.   Eyes:      General: No scleral icterus.        Right eye: No discharge.         Left eye: No discharge.      Conjunctiva/sclera: Conjunctivae normal.   Neck:      Thyroid: No thyromegaly.      Vascular: No carotid bruit.   Cardiovascular:      Rate and Rhythm: Normal rate and regular rhythm.      Heart sounds: Normal heart sounds. No murmur heard.  Pulmonary:      Effort: Pulmonary effort is normal. No respiratory distress.      Breath sounds: Normal breath sounds. No wheezing.   Abdominal:      General: Bowel sounds are normal. There is no distension.      Palpations: Abdomen is soft. There is no mass.      Tenderness: There is no abdominal tenderness. There is no guarding or rebound.   Musculoskeletal:         General: No tenderness. Normal range of motion.      Cervical back: Neck supple.   Lymphadenopathy:      Cervical: No cervical adenopathy.   Skin:     General: Skin is warm and dry.      Findings: No erythema or rash.   Neurological:      Mental Status: She is alert and oriented to person, place, and time.   Psychiatric:         Behavior: Behavior normal.         Thought Content: Thought content normal.         Judgment: Judgment normal.

## 2025-01-10 ENCOUNTER — HOSPITAL ENCOUNTER (OUTPATIENT)
Dept: ULTRASOUND IMAGING | Facility: HOSPITAL | Age: 20
Discharge: HOME | End: 2025-01-10
Payer: COMMERCIAL

## 2025-01-10 DIAGNOSIS — R10.11 RUQ PAIN: ICD-10-CM

## 2025-01-10 PROCEDURE — 76705 ECHO EXAM OF ABDOMEN: CPT

## 2025-01-13 ENCOUNTER — TELEPHONE (OUTPATIENT)
Age: 20
End: 2025-01-13

## 2025-01-13 DIAGNOSIS — K21.9 GASTROESOPHAGEAL REFLUX DISEASE, UNSPECIFIED WHETHER ESOPHAGITIS PRESENT: Primary | ICD-10-CM

## 2025-01-13 NOTE — TELEPHONE ENCOUNTER
I can, however patient told me she would be unable to get to a . [place] provider due to school. Will she be able to get there?

## 2025-01-13 NOTE — TELEPHONE ENCOUNTER
Martha, mother of pt, called in requesting if the referral to Gastro for pt could be internal within [facility]?     Martha is worried the Outgoing referral may not be covered under her insurance?    Please advise & call Martha with update on referral. Thank you!    Martha Saleem: [phone]

## 2025-01-14 ENCOUNTER — RESULTS FOLLOW-UP (OUTPATIENT)
Dept: FAMILY MEDICINE CLINIC | Facility: CLINIC | Age: 20
End: 2025-01-14

## 2025-01-15 DIAGNOSIS — N28.89 PELVIECTASIS OF KIDNEY: Primary | ICD-10-CM

## 2025-01-31 ENCOUNTER — TELEPHONE (OUTPATIENT)
Dept: OBGYN CLINIC | Facility: HOSPITAL | Age: 20
End: 2025-01-31

## 2025-01-31 ENCOUNTER — OFFICE VISIT (OUTPATIENT)
Dept: UROLOGY | Facility: MEDICAL CENTER | Age: 20
End: 2025-01-31
Payer: COMMERCIAL

## 2025-01-31 ENCOUNTER — OFFICE VISIT (OUTPATIENT)
Age: 20
End: 2025-01-31
Payer: COMMERCIAL

## 2025-01-31 VITALS
HEIGHT: 67 IN | WEIGHT: 160 LBS | BODY MASS INDEX: 25.11 KG/M2 | TEMPERATURE: 98.5 F | SYSTOLIC BLOOD PRESSURE: 122 MMHG | DIASTOLIC BLOOD PRESSURE: 78 MMHG | OXYGEN SATURATION: 100 % | HEART RATE: 71 BPM

## 2025-01-31 VITALS
SYSTOLIC BLOOD PRESSURE: 122 MMHG | OXYGEN SATURATION: 100 % | BODY MASS INDEX: 25.11 KG/M2 | DIASTOLIC BLOOD PRESSURE: 78 MMHG | WEIGHT: 160 LBS | HEART RATE: 71 BPM | HEIGHT: 67 IN

## 2025-01-31 DIAGNOSIS — R11.0 NAUSEA: ICD-10-CM

## 2025-01-31 DIAGNOSIS — R10.12 LEFT UPPER QUADRANT ABDOMINAL PAIN: ICD-10-CM

## 2025-01-31 DIAGNOSIS — K21.9 GASTROESOPHAGEAL REFLUX DISEASE, UNSPECIFIED WHETHER ESOPHAGITIS PRESENT: Primary | ICD-10-CM

## 2025-01-31 DIAGNOSIS — N28.89 PELVIECTASIS OF KIDNEY: ICD-10-CM

## 2025-01-31 DIAGNOSIS — K59.04 CHRONIC IDIOPATHIC CONSTIPATION: ICD-10-CM

## 2025-01-31 PROCEDURE — 99203 OFFICE O/P NEW LOW 30 MIN: CPT | Performed by: UROLOGY

## 2025-01-31 PROCEDURE — 99204 OFFICE O/P NEW MOD 45 MIN: CPT | Performed by: NURSE PRACTITIONER

## 2025-01-31 RX ORDER — SODIUM CHLORIDE, SODIUM LACTATE, POTASSIUM CHLORIDE, CALCIUM CHLORIDE 600; 310; 30; 20 MG/100ML; MG/100ML; MG/100ML; MG/100ML
125 INJECTION, SOLUTION INTRAVENOUS CONTINUOUS
OUTPATIENT
Start: 2025-01-31

## 2025-01-31 NOTE — PATIENT INSTRUCTIONS
Proceed with EGD.   Continue Omeprazole and Famotidine as prescribed.   Encouraged the patient to avoid acidic or trigger foods including alcohol as much as possible.  Encouraged the patient to consider purchasing a wedge pillow to help prevent reflux symptoms while sleeping.  Encouraged the patient not to lay down or sleep for at least 3 to 4 hours after eating and to try to avoid late night snacking.  Continue to drink 32-64 oz of water daily.   Continue to watch for red flag symptoms.   Schedule a f/u OV after EGD.     We did review GI red flag symptoms, including, but not limited to: chronic nausea, vomiting, diarrhea, chills, fever, and unintentional weight loss and should call or contact our office with any changes or concerns. I reviewed with the patient that if they notice any blood while vomiting or in their stool they should contact or office or go to the nearest emergency room for immediate evaluation. The patient was agreeable and verbalized an understanding.

## 2025-01-31 NOTE — ASSESSMENT & PLAN NOTE
I discussed with the patient that with their current symptoms and exam findings, I feel it would be beneficial to proceed with an EGD to further evaluate any underlying etiology that could explain their symptoms, with differential diagnoses that could include but are not limited to; GERD, gastric ulcers, Pittman's esophagus, EOE, H. pylori, etc. They were agreeable and verbalized and understanding.     I discussed the risks of procedure with the patient including, but not limited to: bleeding, infection, sore throat, and perforation. The patient gave verbal understanding and is agreeable to proceed. Patient's questions were answered to the best of my ability and until they verbalized an understanding.     Continue omeprazole and famotidine as prescribed by PCP.    Encouraged the patient to avoid acidic or trigger foods including alcohol as much as possible.  Encouraged the patient to consider purchasing a wedge pillow to help prevent reflux symptoms while sleeping.  Encouraged the patient not to lay down or sleep for at least 3 to 4 hours after eating and to try to avoid late night snacking.    Orders:  •  Ambulatory Referral to Gastroenterology  •  EGD; Future

## 2025-01-31 NOTE — PROGRESS NOTES
Name: Nupur Saleem      : 2005      MRN: [id number]  Encounter Provider: Osavldo Roman MD  Encounter Date: 2025   Encounter department: [facility] [place]  :  Assessment & Plan  Pelviectasis of kidney  Sonogram (1/10/2025): mild pelviectasis of right kidney  RUQ pain, but also reports LUQ pain and GERD  - ordered for CT urogram to further determine any concern for right renal obstruction    Orders:    Ambulatory referral to Urology    CT renal protocol; Future        History of Present Illness   Nupur Saleem is a 19 y.o. female who presents for findings of pelviectasis of right kidney    Reports RUQ quadrant pain that started in December.  Has GERD and is taking medication for acid reflux.  Now reports more pain on left side as compared to right.  Pain not associated with food or fluid intake    No history of kidney stones  Pain not associated with nausea/vomiting  No hematuria   No dysuria    RUQ sonogram on 1/10/2025 showed mild right pelviectasis without taz hydronephrosis, no stones      Review of Systems   All other systems reviewed and are negative.    Past Medical History   History reviewed. No pertinent past medical history.  Past Surgical History:   Procedure Laterality Date    BUNIONECTOMY Left 2023    [place] ARTHRS KNE SURG W/MENISCECTOMY MED/LAT W/SHVG Left 2024    Procedure: ARTHROSCOPY KNEE w/ lateral meniscus repair;  Surgeon: Akil Fuentes MD;  Location: [facility];  Service: Orthopedics     Family History   Problem Relation Age of Onset    Thyroid disease Mother     Hypertension Father     Asthma Sister       reports that she has never smoked. She has never been exposed to tobacco smoke. She has never used smokeless tobacco. She reports that she does not drink alcohol and does not use drugs.  Current Outpatient Medications on File Prior to Visit   Medication Sig Dispense Refill    betamethasone dipropionate 0.05 % lotion APPLY TOPICALLY TO  "AFFECTED AREAS OF SCALP DAILY AS NEEDED      drospirenone-ethinyl estradiol (Loryna) 3-0.02 MG per tablet Take 1 tablet by mouth daily 84 tablet 3    famotidine (PEPCID) 20 mg tablet Take 1 tablet (20 mg total) by mouth 2 (two) times a day 60 tablet 1    ketoconazole (NIZORAL) 2 % shampoo APPLY LATHER TO SCALP, LEAVING ON FOR 5 MINUTES BEFORE RINSING. USE 2-3 TIMES PER WEEK      omeprazole (PriLOSEC) 40 MG capsule Take 1 capsule (40 mg total) by mouth daily before breakfast 30 capsule 1    ondansetron (ZOFRAN-ODT) 4 mg disintegrating tablet dissolve 1 tablet ON TONGUE three times a day if needed for nausea OR vomiting      triamcinolone (KENALOG) 0.1 % cream APPLY TOPICALLY TO AFFECTED AREAS OF ECZEMA ARMS, LEGS AND TRUNK DAILY AS NEEDED      EPINEPHrine (EPIPEN) 0.3 mg/0.3 mL SOAJ Inject 0.3 mL (0.3 mg total) into a muscle once for 1 dose 0.6 mL 0     No current facility-administered medications on file prior to visit.     Allergies   Allergen Reactions    Shellfish-Derived Products - Food Allergy Hives         Objective   /78 (BP Location: Left arm, Patient Position: Sitting, Cuff Size: Standard)   Pulse 71   Ht 5' 7\" (1.702 m)   Wt 72.6 kg (160 lb)   SpO2 100%   BMI 25.06 kg/m²     Physical Exam  Vitals and nursing note reviewed.   Constitutional:       General: She is not in acute distress.     Appearance: She is well-developed.   HENT:      Head: Normocephalic and atraumatic.   Eyes:      Conjunctiva/sclera: Conjunctivae normal.   Cardiovascular:      Rate and Rhythm: Normal rate and regular rhythm.      Heart sounds: No murmur heard.  Pulmonary:      Effort: Pulmonary effort is normal. No respiratory distress.      Breath sounds: Normal breath sounds.   Abdominal:      Palpations: Abdomen is soft.      Tenderness: There is no abdominal tenderness. There is no right CVA tenderness or left CVA tenderness.   Musculoskeletal:         General: No swelling.      Cervical back: Neck supple.   Skin:     " "General: Skin is warm and dry.      Capillary Refill: Capillary refill takes less than 2 seconds.   Neurological:      Mental Status: She is alert.   Psychiatric:         Mood and Affect: Mood normal.            Results  No results found for: \"PSA\"  Lab Results   Component Value Date    CALCIUM 8.9 11/30/2024    K 4.1 11/30/2024    CO2 28 11/30/2024     11/30/2024    BUN 14 11/30/2024    CREATININE 0.76 11/30/2024     Lab Results   Component Value Date    WBC 10.77 (H) 11/30/2024    HGB 14.5 11/30/2024    HCT 43.7 11/30/2024    MCV 88 11/30/2024     11/30/2024       Office Urine Dip  No results found for this or any previous visit (from the past hour).]      "

## 2025-01-31 NOTE — PROGRESS NOTES
Name: Nupur Saleem      : 2005      MRN: [id number]  Encounter Provider: FARSHAD Broussard  Encounter Date: 2025   Encounter department: [facility] [place]  :  Assessment & Plan  Gastroesophageal reflux disease, unspecified whether esophagitis present  I discussed with the patient that with their current symptoms and exam findings, I feel it would be beneficial to proceed with an EGD to further evaluate any underlying etiology that could explain their symptoms, with differential diagnoses that could include but are not limited to; GERD, gastric ulcers, Pittman's esophagus, EOE, H. pylori, etc. They were agreeable and verbalized and understanding.     I discussed the risks of procedure with the patient including, but not limited to: bleeding, infection, sore throat, and perforation. The patient gave verbal understanding and is agreeable to proceed. Patient's questions were answered to the best of my ability and until they verbalized an understanding.     Continue omeprazole and famotidine as prescribed by PCP.    Encouraged the patient to avoid acidic or trigger foods including alcohol as much as possible.  Encouraged the patient to consider purchasing a wedge pillow to help prevent reflux symptoms while sleeping.  Encouraged the patient not to lay down or sleep for at least 3 to 4 hours after eating and to try to avoid late night snacking.    Orders:  •  Ambulatory Referral to Gastroenterology  •  EGD; Future    Left upper quadrant abdominal pain  Orders:  •  EGD; Future    Nausea  Orders:  •  EGD; Future    Chronic idiopathic constipation  Continue to work on drinking 32 to 64 ounces of water daily.  Continue to try to eat a well balanced diet with balanced dietary fiber.  Continue to watch for red flag symptoms.    Red flag symptoms reviewed with the patient while in the office today.       The patient is to schedule a follow up office visit after her EGD, but  understands to call or contact our offices with any issues before then or as needed.     History of Present Illness   HPI  Nupur Saleem is a 19 y.o. female who presents today for her initial consultation and evaluation with worsening GERD symptoms, nausea, and left upper quadrant abdominal pain.  She also reports intermittent left upper quadrant abdominal pain with constipation and straining at times.  The patient reports that the GERD symptoms especially have been worsening since the summer and her PCP did start her on omeprazole and famotidine which she feels has helped the previous symptom of vomiting when that is stopped but she continues with the intermittent nausea and the GERD symptoms.    The patient currently denies any dysphagia, vomiting, decreased appetite, or unplanned weight loss.  Water Intake: 48-64 oz daily.     The patient currently reports that they have a BM daily and reports that it is sometimes relieving, without any consistent diarrhea, nocturnal BMs, melena or bloody stools. Last BM: Yesterday. Flatus: No.    PMH/PSH:   Abdominal/Chest Surgery: Denied  Colon Cancer: Denied  Any Cancer: Denied  Pre-Cancerous Polyps: N/A  Crohn's: N/A  Ulcerative Colitis: N/A  Pittman's Esophagus: N/A  Celiac Disease: N/A  Liver Disease: Denied    Tobacco/Vaping: Denied  ETOH: Sometimes  Marijuana: Denied  Illicit Drug Use: Denied    FH:  Colon Cancer: Denied  Any Cancer: Paternal Grandmother: Lung  Family Members with Pre-Cancerous Polyps: Denied  Crohn's: Denied  Ulcerative Colitis: Denied  Celiac Disease: Denied  Liver Disease: Denied    Meds: Famotidine 20 milligrams twice daily, omeprazole 40 mg daily and Zofran as needed as prescribed her primary care provider.  Daily NSAID Use: Denied  Daily Tylenol Use: Denied  Any New Meds: Omeprazole and Famotidine      Imaging: (1/10/25) Right upper quadrant ultrasound: Normal.    Endoscopy History: EGD: (None):     COLONOSCOPY: (None):     History obtained from:  "patient    Review of Systems       Objective   /78 (BP Location: Left arm, Patient Position: Sitting, Cuff Size: Standard)   Pulse 71   Temp 98.5 °F (36.9 °C) (Temporal)   Ht 5' 7\" (1.702 m)   Wt 72.6 kg (160 lb)   SpO2 100%   BMI 25.06 kg/m²      Physical Exam  Sclera without icterus and benign. Lung sounds clear to auscultation b/l. Normal S1 & S2 upon exam. Abdomen is soft, nondistended, with mild to moderate tenderness in the epigastric region as well as the left upper and lower quadrants without any significant guarding or rebound tenderness noted upon exam, with faint bowel sounds x 4.  No edema noted of the b/l lower extremities upon exam today. Skin is non-icteric.       "

## 2025-02-06 PROBLEM — K59.04 CHRONIC IDIOPATHIC CONSTIPATION: Status: ACTIVE | Noted: 2025-02-06

## 2025-02-06 NOTE — ASSESSMENT & PLAN NOTE
Continue to work on drinking 32 to 64 ounces of water daily.  Continue to try to eat a well balanced diet with balanced dietary fiber.  Continue to watch for red flag symptoms.    Red flag symptoms reviewed with the patient while in the office today.

## 2025-02-15 ENCOUNTER — HOSPITAL ENCOUNTER (OUTPATIENT)
Dept: CT IMAGING | Facility: HOSPITAL | Age: 20
Discharge: HOME | End: 2025-02-15
Attending: UROLOGY
Payer: COMMERCIAL

## 2025-02-15 DIAGNOSIS — N28.89 PELVIECTASIS OF KIDNEY: ICD-10-CM

## 2025-02-15 PROCEDURE — 74178 CT ABD&PLV WO CNTR FLWD CNTR: CPT

## 2025-02-15 RX ADMIN — IOHEXOL 120 ML: 350 INJECTION, SOLUTION INTRAVENOUS at 09:19

## 2025-02-20 ENCOUNTER — RESULTS FOLLOW-UP (OUTPATIENT)
Dept: OTHER | Facility: HOSPITAL | Age: 20
End: 2025-02-20

## 2025-02-21 NOTE — TELEPHONE ENCOUNTER
----- Message from Darrius Haywood MD sent at 2/20/2025  4:12 PM EST -----  Inform pt that the  kidneys were normal on CT. Keep usual follow up appt.

## 2025-02-21 NOTE — TELEPHONE ENCOUNTER
Left VM informing patient CT scan  was normal.  Reminded patient she has an appt 3/14 with Dr. Roman.   Left office #[phone].

## 2025-03-10 ENCOUNTER — ANESTHESIA EVENT (OUTPATIENT)
Dept: PERIOP | Facility: HOSPITAL | Age: 20
End: 2025-03-10
Payer: COMMERCIAL

## 2025-03-11 ENCOUNTER — ANESTHESIA (OUTPATIENT)
Dept: PERIOP | Facility: HOSPITAL | Age: 20
End: 2025-03-11
Payer: COMMERCIAL

## 2025-03-11 ENCOUNTER — HOSPITAL ENCOUNTER (OUTPATIENT)
Dept: PERIOP | Facility: HOSPITAL | Age: 20
Setting detail: OUTPATIENT SURGERY
Discharge: HOME | End: 2025-03-11
Payer: COMMERCIAL

## 2025-03-11 VITALS
DIASTOLIC BLOOD PRESSURE: 69 MMHG | OXYGEN SATURATION: 99 % | SYSTOLIC BLOOD PRESSURE: 115 MMHG | HEART RATE: 76 BPM | RESPIRATION RATE: 20 BRPM | TEMPERATURE: 98.4 F

## 2025-03-11 DIAGNOSIS — R11.0 NAUSEA: ICD-10-CM

## 2025-03-11 DIAGNOSIS — K21.9 GASTROESOPHAGEAL REFLUX DISEASE, UNSPECIFIED WHETHER ESOPHAGITIS PRESENT: ICD-10-CM

## 2025-03-11 DIAGNOSIS — R10.12 LEFT UPPER QUADRANT ABDOMINAL PAIN: ICD-10-CM

## 2025-03-11 LAB
EXT PREGNANCY TEST URINE: NEGATIVE
EXT. CONTROL: NORMAL

## 2025-03-11 PROCEDURE — 88305 TISSUE EXAM BY PATHOLOGIST: CPT | Performed by: PATHOLOGY

## 2025-03-11 PROCEDURE — 81025 URINE PREGNANCY TEST: CPT | Performed by: ANESTHESIOLOGY

## 2025-03-11 RX ORDER — SODIUM CHLORIDE, SODIUM LACTATE, POTASSIUM CHLORIDE, CALCIUM CHLORIDE 600; 310; 30; 20 MG/100ML; MG/100ML; MG/100ML; MG/100ML
INJECTION, SOLUTION INTRAVENOUS CONTINUOUS PRN
Status: DISCONTINUED | OUTPATIENT
Start: 2025-03-11 | End: 2025-03-11

## 2025-03-11 RX ORDER — SODIUM CHLORIDE, SODIUM LACTATE, POTASSIUM CHLORIDE, CALCIUM CHLORIDE 600; 310; 30; 20 MG/100ML; MG/100ML; MG/100ML; MG/100ML
125 INJECTION, SOLUTION INTRAVENOUS CONTINUOUS
Status: DISCONTINUED | OUTPATIENT
Start: 2025-03-11 | End: 2025-03-15 | Stop reason: HOSPADM

## 2025-03-11 RX ORDER — PROPOFOL 10 MG/ML
INJECTION, EMULSION INTRAVENOUS AS NEEDED
Status: DISCONTINUED | OUTPATIENT
Start: 2025-03-11 | End: 2025-03-11

## 2025-03-11 RX ORDER — LIDOCAINE HYDROCHLORIDE 20 MG/ML
INJECTION, SOLUTION EPIDURAL; INFILTRATION; INTRACAUDAL; PERINEURAL AS NEEDED
Status: DISCONTINUED | OUTPATIENT
Start: 2025-03-11 | End: 2025-03-11

## 2025-03-11 RX ADMIN — PROPOFOL 120 MG: 10 INJECTION, EMULSION INTRAVENOUS at 12:52

## 2025-03-11 RX ADMIN — SODIUM CHLORIDE, SODIUM LACTATE, POTASSIUM CHLORIDE, AND CALCIUM CHLORIDE: .6; .31; .03; .02 INJECTION, SOLUTION INTRAVENOUS at 12:40

## 2025-03-11 RX ADMIN — PROPOFOL 100 MG: 10 INJECTION, EMULSION INTRAVENOUS at 12:56

## 2025-03-11 RX ADMIN — PROPOFOL 100 MG: 10 INJECTION, EMULSION INTRAVENOUS at 12:58

## 2025-03-11 RX ADMIN — SODIUM CHLORIDE, SODIUM LACTATE, POTASSIUM CHLORIDE, AND CALCIUM CHLORIDE 125 ML/HR: .6; .31; .03; .02 INJECTION, SOLUTION INTRAVENOUS at 11:36

## 2025-03-11 RX ADMIN — PROPOFOL 100 MG: 10 INJECTION, EMULSION INTRAVENOUS at 12:54

## 2025-03-11 RX ADMIN — LIDOCAINE HYDROCHLORIDE 100 MG: 20 INJECTION, SOLUTION EPIDURAL; INFILTRATION; INTRACAUDAL at 12:52

## 2025-03-11 NOTE — H&P
History and Physical -  [facility]  Nupur Saleem 19 y.o. female MRN: [id number]                  HPI: Nupur Saleem is a 19 y.o. year old female who presents for GERD, nausea, LUQ pain      REVIEW OF SYSTEMS: Per the HPI, and otherwise unremarkable.    Historical Information   History reviewed. No pertinent past medical history.  Past Surgical History:   Procedure Laterality Date    BUNIONECTOMY Left 07/11/2023    [place] ARTHRS KNE SURG W/MENISCECTOMY MED/LAT W/SHVG Left 1/4/2024    Procedure: ARTHROSCOPY KNEE w/ lateral meniscus repair;  Surgeon: Akil Fuentes MD;  Location: [facility];  Service: Orthopedics     Social History   Social History     Substance and Sexual Activity   Alcohol Use Never     Social History     Substance and Sexual Activity   Drug Use Never     Social History     Tobacco Use   Smoking Status Never    Passive exposure: Never   Smokeless Tobacco Never     Family History   Problem Relation Age of Onset    Thyroid disease Mother     Hypertension Father     Asthma Sister        Meds/Allergies     Not in a hospital admission.    Allergies   Allergen Reactions    Shellfish-Derived Products - Food Allergy Hives       Objective     Blood pressure 125/69, pulse 80, temperature (!) 97.2 °F (36.2 °C), temperature source Tympanic, resp. rate 18, last menstrual period 02/22/2025, SpO2 97%.      PHYSICAL EXAMINATION:    General Appearance:   Alert, cooperative, no distress   HEENT:  Normocephalic, atraumatic, anicteric. Neck supple, symmetrical, trachea midline.   Lungs:   Equal chest rise and unlabored breathing, normal effort, no coughing.   Cardiovascular:   No visualized JVD.   Abdomen:   No abdominal distension.   Skin:   No jaundice, rashes, or lesions.    Musculoskeletal:   Normal range of motion visualized.   Psych:  Normal affect and normal insight.   Neuro:  Alert and appropriate.           ASSESSMENT/PLAN:  This is a 19 y.o. year old female here for EGD, and she is stable  and optimized for her procedure.

## 2025-03-11 NOTE — ANESTHESIA PREPROCEDURE EVALUATION
Procedure:  EGD    Relevant Problems   GI/HEPATIC   (+) Gastroesophageal reflux disease        Physical Exam    Airway    Mallampati score: II  TM Distance: >3 FB  Neck ROM: full     Dental   No notable dental hx     Cardiovascular      Pulmonary      Other Findings  post-pubertal.      Anesthesia Plan  ASA Score- 2     Anesthesia Type- IV sedation with anesthesia with ASA Monitors.         Additional Monitors:     Airway Plan:            Plan Factors-Exercise tolerance (METS): >4 METS.    Chart reviewed.    Patient summary reviewed.    Patient is not a current smoker.              Induction- intravenous.    Postoperative Plan-         Informed Consent- Anesthetic plan and risks discussed with patient.  I personally reviewed this patient with the CRNA. Discussed and agreed on the Anesthesia Plan with the CRNA..      NPO Status:  No vitals data found for the desired time range.

## 2025-03-11 NOTE — ANESTHESIA POSTPROCEDURE EVALUATION
Post-Op Assessment Note    CV Status:  Stable    Pain management: adequate       Mental Status:  Sleepy   Hydration Status:  Euvolemic   PONV Controlled:  Controlled   Airway Patency:  Patent     Post Op Vitals Reviewed: Yes    No anethesia notable event occurred.    Staff: CRNA           Last Filed PACU Vitals:  Vitals Value Taken Time   Temp 97.8    Pulse 75 03/11/25 1307   /59 03/11/25 1306   Resp 16 03/11/25 1307   SpO2 100 % 03/11/25 1307   Vitals shown include unfiled device data.

## 2025-03-17 PROCEDURE — 88305 TISSUE EXAM BY PATHOLOGIST: CPT | Performed by: PATHOLOGY

## 2025-03-18 ENCOUNTER — RESULTS FOLLOW-UP (OUTPATIENT)
Age: 20
End: 2025-03-18

## 2025-04-09 ENCOUNTER — TELEMEDICINE (OUTPATIENT)
Age: 20
End: 2025-04-09
Payer: COMMERCIAL

## 2025-04-09 VITALS — BODY MASS INDEX: 25.11 KG/M2 | HEIGHT: 67 IN | WEIGHT: 160 LBS

## 2025-04-09 DIAGNOSIS — K21.9 GASTROESOPHAGEAL REFLUX DISEASE, UNSPECIFIED WHETHER ESOPHAGITIS PRESENT: Primary | ICD-10-CM

## 2025-04-09 DIAGNOSIS — K59.04 CHRONIC IDIOPATHIC CONSTIPATION: ICD-10-CM

## 2025-04-09 DIAGNOSIS — R11.0 NAUSEA: ICD-10-CM

## 2025-04-09 PROCEDURE — 99214 OFFICE O/P EST MOD 30 MIN: CPT | Performed by: NURSE PRACTITIONER

## 2025-04-09 RX ORDER — FAMOTIDINE 40 MG/1
40 TABLET, FILM COATED ORAL 2 TIMES DAILY
Qty: 60 TABLET | Refills: 3 | Status: SHIPPED | OUTPATIENT
Start: 2025-04-09 | End: 2026-04-04

## 2025-04-09 RX ORDER — POLYETHYLENE GLYCOL 3350 17 G/17G
17 POWDER, FOR SOLUTION ORAL DAILY
Qty: 578 G | Refills: 3 | Status: SHIPPED | OUTPATIENT
Start: 2025-04-09

## 2025-04-09 NOTE — PATIENT INSTRUCTIONS
Famotidine 40 mg, 1 pill, 2 x daily for 2 months, THEN  Famotidine 40 mg, 1 pill at bed time, x 1 month, THEN,  Famotidine 40 mg, 1 pill 2 x daily AS NEEDED for any Upper GI symptoms.   Encouraged the patient to avoid acidic or trigger foods including alcohol as much as possible.  Encouraged the patient to consider purchasing a wedge pillow to help prevent reflux symptoms while sleeping.  Encouraged the patient not to lay down or sleep for at least 3 to 4 hours after eating and to try to avoid late night snacking.  Start Miralax 1/2-1 capful daily and can titrate up or down depending on stool frequency and consistency.   Continue to drink at least 32-64 oz of water daily.   Continue to watch for red flag symptoms.   Schedule a f/u OV in 3 months.     We did review GI red flag symptoms, including, but not limited to: chronic nausea, vomiting, diarrhea, chills, fever, and unintentional weight loss and should call or contact our office with any changes or concerns. I reviewed with the patient that if they notice any blood while vomiting or in their stool they should contact or office or go to the nearest emergency room for immediate evaluation. The patient was agreeable and verbalized an understanding.

## 2025-04-09 NOTE — ASSESSMENT & PLAN NOTE
During the patient's virtual visit today, I discussed with her that at this point in time since her EGD was normal I do not feel she needs to go back on a PPI but since she is still having some GERD symptoms and the nausea, what I would like to do is put her back on famotidine 40 mg twice daily for 2 months, then decrease the famotidine to 1 pill at bedtime and can use the second dose as needed during the day for 1 month, then can use the famotidine twice a day as needed for any upper GI symptoms or nausea.  The patient was agreeable and verbalized an understanding.    Encouraged the patient to avoid acidic or trigger foods including alcohol as much as possible.  Encouraged the patient to consider purchasing a wedge pillow to help prevent reflux symptoms while sleeping.  Encouraged the patient not to lay down or sleep for at least 3 to 4 hours after eating and to try to avoid late night snacking.  Orders:  •  famotidine (PEPCID) 40 MG tablet; Take 1 tablet (40 mg total) by mouth 2 (two) times a day

## 2025-04-09 NOTE — PROGRESS NOTES
Virtual Regular VisitName: Nupur Saleem      : 2005      MRN: [id number]  Encounter Provider: FARSHAD Broussard  Encounter Date: 2025   Encounter department: [facility]  :  Assessment & Plan  Gastroesophageal reflux disease, unspecified whether esophagitis present  During the patient's virtual visit today, I discussed with her that at this point in time since her EGD was normal I do not feel she needs to go back on a PPI but since she is still having some GERD symptoms and the nausea, what I would like to do is put her back on famotidine 40 mg twice daily for 2 months, then decrease the famotidine to 1 pill at bedtime and can use the second dose as needed during the day for 1 month, then can use the famotidine twice a day as needed for any upper GI symptoms or nausea.  The patient was agreeable and verbalized an understanding.    Encouraged the patient to avoid acidic or trigger foods including alcohol as much as possible.  Encouraged the patient to consider purchasing a wedge pillow to help prevent reflux symptoms while sleeping.  Encouraged the patient not to lay down or sleep for at least 3 to 4 hours after eating and to try to avoid late night snacking.  Orders:  •  famotidine (PEPCID) 40 MG tablet; Take 1 tablet (40 mg total) by mouth 2 (two) times a day    Nausea  Orders:  •  famotidine (PEPCID) 40 MG tablet; Take 1 tablet (40 mg total) by mouth 2 (two) times a day    Chronic idiopathic constipation  I also discussed with the patient that I do feel that her constipation could be contributing to her some of her upper GI symptoms and that it would be in her best bet to try to get her on a better bowel regimen.    Advised the patient to start MiraLAX at half to a full capful daily and can titrate up or down depending on stool frequency and consistency.  Encouraged the patient to continue to try to drink at least 32 to 64 ounces of water daily.  Orders:  •   "polyethylene glycol (GLYCOLAX) 17 GM/SCOOP powder; Take 17 g by mouth daily    The patient is to schedule a follow up office visit in 2-3 months, but understands to call or contact our offices with any issues before then or as needed.     History of Present Illness     HPI  Review of Systems  The patient presents today for a virtual video follow-up visit regarding her continued GERD symptoms, nausea, and intermittent idiopathic constipation.  Since her last office visit she did proceed with the EGD as discussed on March 11, 2025 with Dr. Severino which was completely normal, including the pathology.  The patient did complete the previously prescribed famotidine and omeprazole from her primary care provider.  She does feel that when she was on the medications her symptoms were a little bit better and overall there is improvement in the reflux symptoms especially but she still has the intermittent nausea, which she reports she will wake up occasionally with at nighttime feeling as though she is going to vomit but does not vomit.    The patient reports she is still dealing with some intermittent constipation and straining to have bowel movements at times and was previously trying to take Benefiber and drink more water but actually feels the Benefiber made her more bloated and gassy.    The patient currently denies any dysphagia, vomiting, decreased appetite, unplanned weight loss, or abdominal pain. Water Intake: Adequate amounts per day.    The patient currently reports that they have a BM every 1-3 days and reports that it is not relieving, without any consistent diarrhea, melena or bloody stools. Last BM: This morning. Flatus: Minimal.    GI Meds: None  Daily NSAID Use: Denied    Imaging: (None):     Endoscopy History: EGD: (3/11/25): Normal. Path: Normal.    COLONOSCOPY: (None):     Objective   Ht 5' 7\" (1.702 m) Comment: pt reported  Wt 72.6 kg (160 lb) Comment: patient resports  LMP 02/22/2025   BMI 25.06 " kg/m²     Administrative Statements   Encounter provider FARSHAD Broussard    The Patient is located at Home and in the following state in which I hold an active license [place].    The patient was identified by name and date of birth. Nupur Saleem was informed that this is a telemedicine visit and that the visit is being conducted through the Epic Embedded platform. She agrees to proceed..  My office door was closed. No one else was in the room.  She acknowledged consent and understanding of privacy and security of the video platform. The patient has agreed to participate and understands they can discontinue the visit at any time.    I have spent a total time of 30 minutes in caring for this patient on the day of the visit/encounter including Diagnostic results, Risks and benefits of tx options, Instructions for management, Patient and family education, Impressions, Counseling / Coordination of care, Documenting in the medical record, Reviewing/placing orders in the medical record (including tests, medications, and/or procedures), and Obtaining or reviewing history  , not including the time spent for establishing the audio/video connection.

## 2025-04-09 NOTE — ASSESSMENT & PLAN NOTE
I also discussed with the patient that I do feel that her constipation could be contributing to her some of her upper GI symptoms and that it would be in her best bet to try to get her on a better bowel regimen.    Advised the patient to start MiraLAX at half to a full capful daily and can titrate up or down depending on stool frequency and consistency.  Encouraged the patient to continue to try to drink at least 32 to 64 ounces of water daily.  Orders:  •  polyethylene glycol (GLYCOLAX) 17 GM/SCOOP powder; Take 17 g by mouth daily

## 2025-04-10 NOTE — ASSESSMENT & PLAN NOTE
Orders:  •  famotidine (PEPCID) 40 MG tablet; Take 1 tablet (40 mg total) by mouth 2 (two) times a day

## 2025-06-25 ENCOUNTER — OFFICE VISIT (OUTPATIENT)
Dept: URGENT CARE | Facility: MEDICAL CENTER | Age: 20
End: 2025-06-25
Payer: COMMERCIAL

## 2025-06-25 VITALS
TEMPERATURE: 98.5 F | RESPIRATION RATE: 16 BRPM | SYSTOLIC BLOOD PRESSURE: 110 MMHG | OXYGEN SATURATION: 98 % | BODY MASS INDEX: 25.39 KG/M2 | WEIGHT: 161.8 LBS | DIASTOLIC BLOOD PRESSURE: 66 MMHG | HEART RATE: 80 BPM | HEIGHT: 67 IN

## 2025-06-25 DIAGNOSIS — N30.00 ACUTE CYSTITIS WITHOUT HEMATURIA: Primary | ICD-10-CM

## 2025-06-25 LAB
SL AMB  POCT GLUCOSE, UA: NEGATIVE
SL AMB LEUKOCYTE ESTERASE,UA: ABNORMAL
SL AMB POCT BILIRUBIN,UA: NEGATIVE
SL AMB POCT BLOOD,UA: NEGATIVE
SL AMB POCT CLARITY,UA: CLEAR
SL AMB POCT COLOR,UA: ABNORMAL
SL AMB POCT KETONES,UA: ABNORMAL
SL AMB POCT NITRITE,UA: NEGATIVE
SL AMB POCT PH,UA: 6.5
SL AMB POCT SPECIFIC GRAVITY,UA: 1
SL AMB POCT URINE PROTEIN: NEGATIVE
SL AMB POCT UROBILINOGEN: 0.2

## 2025-06-25 PROCEDURE — 81002 URINALYSIS NONAUTO W/O SCOPE: CPT

## 2025-06-25 PROCEDURE — 87086 URINE CULTURE/COLONY COUNT: CPT

## 2025-06-25 PROCEDURE — 99213 OFFICE O/P EST LOW 20 MIN: CPT

## 2025-06-25 PROCEDURE — 87077 CULTURE AEROBIC IDENTIFY: CPT

## 2025-06-25 PROCEDURE — 87186 SC STD MICRODIL/AGAR DIL: CPT

## 2025-06-25 RX ORDER — NITROFURANTOIN 25; 75 MG/1; MG/1
100 CAPSULE ORAL 2 TIMES DAILY
Qty: 10 CAPSULE | Refills: 0 | Status: SHIPPED | OUTPATIENT
Start: 2025-06-25 | End: 2025-06-30

## 2025-06-25 NOTE — PROGRESS NOTES
[facility]        NAME: Nupur Saleem is a 19 y.o. female  : 2005    MRN: [id number]  DATE: 2025  TIME: 6:16 PM    Assessment and Plan   Acute cystitis without hematuria [N30.00]  1. Acute cystitis without hematuria  Urine culture    POCT urine dip    nitrofurantoin (MACROBID) 100 mg capsule        Urine dip positive for leukocytes.  Patient reporting hematuria yesterday.  Will treat with Macrobid and follow-up on culture.    Patient Instructions   Take Nitrofurantoin as prescribed  Drink plenty of water   Cranberry supplements  Urinate within 5 minutes following intercourse  Follow up with OB/GYN     Follow up with PCP in 3-5 days.  Proceed to  ER if symptoms worsen.    If tests have been performed at [facility], our office will contact you with results if changes need to be made to the care plan discussed with you at the visit.  You can review your full results on [facility] MyChart.    Chief Complaint     Chief Complaint   Patient presents with    Possible UTI     Started 2 days ago  Dysuria, and hematuria   OTC ibuprofen         History of Present Illness       19-year-old female presents with 2 days of dysuria, frequency, hematuria.  She says that hematuria has since cleared up today.  She is still having the other symptoms.  Denies fever, back pain, abdominal pain.  Patient is sexually active but denies concerns for STIs.  She does not believe she has had a UTI before.        Review of Systems   Review of Systems   Constitutional:  Negative for chills and fever.   Gastrointestinal:  Negative for abdominal pain.   Genitourinary:  Positive for dysuria, hematuria and urgency. Negative for flank pain, genital sores and vaginal discharge.   Musculoskeletal:  Negative for back pain.         Current Medications     Current Medications[1]    Current Allergies     Allergies as of 2025 - Reviewed 2025   Allergen Reaction Noted    Shellfish-derived products - food allergy Hives  "01/04/2024            The following portions of the patient's history were reviewed and updated as appropriate: allergies, current medications, past family history, past medical history, past social history, past surgical history and problem list.     Past Medical History[2]    Past Surgical History[3]    Family History[4]      Medications have been verified.        Objective   /66   Pulse 80   Temp 98.5 °F (36.9 °C)   Resp 16   Ht 5' 7\" (1.702 m)   Wt 73.4 kg (161 lb 12.8 oz)   LMP 06/15/2025   SpO2 98%   BMI 25.34 kg/m²   Patient's last menstrual period was 06/15/2025.       Physical Exam     Physical Exam  Vitals and nursing note reviewed.   Constitutional:       General: She is not in acute distress.     Appearance: Normal appearance. She is not ill-appearing, toxic-appearing or diaphoretic.   HENT:      Head: Normocephalic and atraumatic.      Right Ear: External ear normal.      Left Ear: External ear normal.      Nose: Nose normal.     Eyes:      Conjunctiva/sclera: Conjunctivae normal.     Pulmonary:      Effort: Pulmonary effort is normal.   Abdominal:      General: Abdomen is flat. There is no distension.      Palpations: Abdomen is soft. There is no mass.      Tenderness: There is no abdominal tenderness. There is no right CVA tenderness, left CVA tenderness, guarding or rebound.     Skin:     General: Skin is warm and dry.      Capillary Refill: Capillary refill takes less than 2 seconds.     Neurological:      General: No focal deficit present.      Mental Status: She is alert.     Psychiatric:         Mood and Affect: Mood normal.         Behavior: Behavior normal.                        [1]   Current Outpatient Medications:     drospirenone-ethinyl estradiol (Loryna) 3-0.02 MG per tablet, Take 1 tablet by mouth daily, Disp: 84 tablet, Rfl: 3    EPINEPHrine (EPIPEN) 0.3 mg/0.3 mL SOAJ, Inject 0.3 mL (0.3 mg total) into a muscle once for 1 dose, Disp: 0.6 mL, Rfl: 0    famotidine (PEPCID) " 40 MG tablet, Take 1 tablet (40 mg total) by mouth 2 (two) times a day, Disp: 60 tablet, Rfl: 3    nitrofurantoin (MACROBID) 100 mg capsule, Take 1 capsule (100 mg total) by mouth 2 (two) times a day for 5 days, Disp: 10 capsule, Rfl: 0    ondansetron (ZOFRAN-ODT) 4 mg disintegrating tablet, , Disp: , Rfl:     polyethylene glycol (GLYCOLAX) 17 GM/SCOOP powder, Take 17 g by mouth daily, Disp: 578 g, Rfl: 3    triamcinolone (KENALOG) 0.1 % cream, , Disp: , Rfl:     betamethasone dipropionate 0.05 % lotion, APPLY TOPICALLY TO AFFECTED AREAS OF SCALP DAILY AS NEEDED (Patient not taking: Reported on 6/25/2025), Disp: , Rfl:     ketoconazole (NIZORAL) 2 % shampoo, APPLY LATHER TO SCALP, LEAVING ON FOR 5 MINUTES BEFORE RINSING. USE 2-3 TIMES PER WEEK (Patient not taking: Reported on 6/25/2025), Disp: , Rfl:   [2] No past medical history on file.  [3]   Past Surgical History:  Procedure Laterality Date    BUNIONECTOMY Left 07/11/2023    [place] ARTHRS KNE SURG W/MENISCECTOMY MED/LAT W/SHVG Left 1/4/2024    Procedure: ARTHROSCOPY KNEE w/ lateral meniscus repair;  Surgeon: Akil Fuentes MD;  Location: [facility] OR;  Service: Orthopedics   [4]   Family History  Problem Relation Name Age of Onset    Thyroid disease Mother Martha     Hypertension Father Trace     Asthma Sister Samina

## 2025-06-25 NOTE — PATIENT INSTRUCTIONS
Take Nitrofurantoin as prescribed  Drink plenty of water   Cranberry supplements  Urinate within 5 minutes following intercourse  Follow up with OB/GYN

## 2025-06-28 LAB
BACTERIA UR CULT: ABNORMAL
BACTERIA UR CULT: ABNORMAL

## 2025-08-13 ENCOUNTER — ANNUAL EXAM (OUTPATIENT)
Dept: OBGYN CLINIC | Facility: MEDICAL CENTER | Age: 20
End: 2025-08-13
Payer: COMMERCIAL

## (undated) DEVICE — NEEDLE 18 G X 1 1/2

## (undated) DEVICE — SYRINGE 20ML LL

## (undated) DEVICE — GLOVE SRG BIOGEL 6.5

## (undated) DEVICE — PUDDLE VAC

## (undated) DEVICE — 3M™ IOBAN™ 2 ANTIMICROBIAL INCISE DRAPE 6650EZ: Brand: IOBAN™ 2

## (undated) DEVICE — CYSTO TUBING TUR Y IRRIGATION

## (undated) DEVICE — SUT MONOCRYL 2-0 SH 27 IN Y417H

## (undated) DEVICE — BRACE-LONG KNEE TROM 66CM

## (undated) DEVICE — 3M™ STERI-STRIP™ REINFORCED ADHESIVE SKIN CLOSURES, R1547, 1/2 IN X 4 IN (12 MM X 100 MM), 6 STRIPS/ENVELOPE: Brand: 3M™ STERI-STRIP™

## (undated) DEVICE — ACE WRAP 6 IN UNSTERILE

## (undated) DEVICE — 3M™ STERI-DRAPE™ U-DRAPE 1015: Brand: STERI-DRAPE™

## (undated) DEVICE — GLOVE SRG BIOGEL 8

## (undated) DEVICE — COBAN 6 IN STERILE

## (undated) DEVICE — GLOVE INDICATOR PI UNDERGLOVE SZ 7 BLUE

## (undated) DEVICE — CHLORAPREP HI-LITE 26ML ORANGE

## (undated) DEVICE — BLADE SHAVER DISSECTOR 4MM 13CM COOLCUT

## (undated) DEVICE — BETHLEHEM UNIVERSAL  ARTHRO PK: Brand: CARDINAL HEALTH

## (undated) DEVICE — TIBURON SPLIT SHEET: Brand: CONVERTORS

## (undated) DEVICE — KNOT PUSHER/SUTURE CUTTER W/ PORTAL SKID BUNDLE

## (undated) DEVICE — 4-PORT MANIFOLD: Brand: NEPTUNE 2

## (undated) DEVICE — GLOVE SRG BIOGEL 7.5

## (undated) DEVICE — GLOVE INDICATOR PI UNDERGLOVE SZ 8.5 BLUE

## (undated) DEVICE — GAUZE SPONGES,16 PLY: Brand: CURITY

## (undated) DEVICE — ASTOUND STANDARD SURGICAL GOWN, XL: Brand: CONVERTORS

## (undated) DEVICE — TUBING SUCTION 5MM X 12 FT

## (undated) DEVICE — SCD SEQUENTIAL COMPRESSION COMFORT SLEEVE MEDIUM KNEE LENGTH: Brand: KENDALL SCD

## (undated) DEVICE — INTENDED FOR TISSUE SEPARATION, AND OTHER PROCEDURES THAT REQUIRE A SHARP SURGICAL BLADE TO PUNCTURE OR CUT.: Brand: BARD-PARKER ® CARBON RIB-BACK BLADES

## (undated) DEVICE — TUBING EXTENSION 6 FT CONTIN WAVE

## (undated) DEVICE — PADDING CAST 6IN COTTON STRL

## (undated) DEVICE — IMPERVIOUS STOCKINETTE: Brand: DEROYAL

## (undated) DEVICE — ABDOMINAL PAD: Brand: DERMACEA